# Patient Record
Sex: MALE | Race: WHITE | RURAL
[De-identification: names, ages, dates, MRNs, and addresses within clinical notes are randomized per-mention and may not be internally consistent; named-entity substitution may affect disease eponyms.]

---

## 2019-06-28 ENCOUNTER — ON CAMPUS - OUTPATIENT (OUTPATIENT)
Dept: RURAL HOSPITAL 6 | Facility: HOSPITAL | Age: 74
End: 2019-06-28
Payer: COMMERCIAL

## 2019-06-28 DIAGNOSIS — D12.4 BENIGN NEOPLASM OF DESCENDING COLON: ICD-10-CM

## 2019-06-28 DIAGNOSIS — Z80.9 FAMILY HISTORY OF MALIGNANT NEOPLASM, UNSPECIFIED: ICD-10-CM

## 2019-06-28 DIAGNOSIS — D12.3 BENIGN NEOPLASM OF TRANSVERSE COLON: ICD-10-CM

## 2019-06-28 DIAGNOSIS — K64.8 OTHER HEMORRHOIDS: ICD-10-CM

## 2019-06-28 DIAGNOSIS — D12.0 BENIGN NEOPLASM OF CECUM: ICD-10-CM

## 2019-06-28 DIAGNOSIS — D12.2 BENIGN NEOPLASM OF ASCENDING COLON: ICD-10-CM

## 2019-06-28 DIAGNOSIS — Z86.010 PERSONAL HISTORY OF COLONIC POLYPS: ICD-10-CM

## 2019-06-28 DIAGNOSIS — K57.90 DIVERTICULOSIS OF INTESTINE, PART UNSPECIFIED, WITHOUT PERFO: ICD-10-CM

## 2019-06-28 DIAGNOSIS — R19.5 OTHER FECAL ABNORMALITIES: ICD-10-CM

## 2019-06-28 PROCEDURE — 45385 COLONOSCOPY W/LESION REMOVAL: CPT | Mod: 33 | Performed by: INTERNAL MEDICINE

## 2021-11-10 ENCOUNTER — HOSPITAL ENCOUNTER (EMERGENCY)
Dept: HOSPITAL 22 - UTC | Age: 76
Discharge: HOME | End: 2021-11-10
Payer: MEDICARE

## 2021-11-10 VITALS
RESPIRATION RATE: 16 BRPM | SYSTOLIC BLOOD PRESSURE: 140 MMHG | OXYGEN SATURATION: 97 % | HEART RATE: 87 BPM | DIASTOLIC BLOOD PRESSURE: 70 MMHG | TEMPERATURE: 101.3 F

## 2021-11-10 VITALS
SYSTOLIC BLOOD PRESSURE: 118 MMHG | HEART RATE: 58 BPM | DIASTOLIC BLOOD PRESSURE: 63 MMHG | OXYGEN SATURATION: 96 % | RESPIRATION RATE: 16 BRPM

## 2021-11-10 VITALS
HEART RATE: 87 BPM | RESPIRATION RATE: 16 BRPM | OXYGEN SATURATION: 97 % | SYSTOLIC BLOOD PRESSURE: 140 MMHG | DIASTOLIC BLOOD PRESSURE: 70 MMHG

## 2021-11-10 VITALS
OXYGEN SATURATION: 96 % | RESPIRATION RATE: 16 BRPM | HEART RATE: 76 BPM | DIASTOLIC BLOOD PRESSURE: 81 MMHG | SYSTOLIC BLOOD PRESSURE: 126 MMHG

## 2021-11-10 VITALS
HEART RATE: 72 BPM | TEMPERATURE: 98 F | RESPIRATION RATE: 16 BRPM | DIASTOLIC BLOOD PRESSURE: 71 MMHG | OXYGEN SATURATION: 96 % | SYSTOLIC BLOOD PRESSURE: 120 MMHG

## 2021-11-10 VITALS — BODY MASS INDEX: 24.4 KG/M2 | BODY MASS INDEX: 24.3 KG/M2

## 2021-11-10 DIAGNOSIS — B34.9: ICD-10-CM

## 2021-11-10 DIAGNOSIS — E78.5: ICD-10-CM

## 2021-11-10 DIAGNOSIS — Z79.899: ICD-10-CM

## 2021-11-10 DIAGNOSIS — J12.9: Primary | ICD-10-CM

## 2021-11-10 DIAGNOSIS — I10: ICD-10-CM

## 2021-11-10 DIAGNOSIS — E11.9: ICD-10-CM

## 2021-11-10 LAB
ALBUMIN LEVEL: 3.6 G/DL (ref 3.5–5)
ALBUMIN/GLOB SERPL: 1.1 {RATIO} (ref 1.1–1.8)
ALP ISO SERPL-ACNC: 62 U/L (ref 38–126)
ALT SERPLBLD-CCNC: 12 U/L (ref 12–78)
ANION GAP SERPL CALC-SCNC: 12.3 MEQ/L (ref 5–15)
AST SERPL QL: 42 U/L (ref 17–59)
BILIRUBIN,TOTAL: 0.8 MG/DL (ref 0.2–1.3)
BUN SERPL-MCNC: 31 MG/DL (ref 9–20)
CALCIUM SPEC-MCNC: 8.6 MG/DL (ref 8.4–10.2)
CELLS COUNTED: 100
CHLORIDE SPEC-SCNC: 106 MMOL/L (ref 98–107)
CO2 SERPL-SCNC: 28 MMOL/L (ref 22–30)
CREAT BLD-SCNC: 1.8 MG/DL (ref 0.66–1.25)
CREATININE CLEARANCE ESTIMATED: 40 ML/MIN (ref 50–200)
ESTIMATED GLOMERULAR FILT RATE: 37 ML/MIN (ref 60–?)
GFR (AFRICAN AMERICAN): 44 ML/MIN (ref 60–?)
GLOBULIN SER CALC-MCNC: 3.3 G/DL (ref 1.3–3.2)
GLUCOSE: 61 MG/DL (ref 74–100)
HCT VFR BLD CALC: 39.4 % (ref 42–52)
HGB BLD-MCNC: 13 G/DL (ref 14.1–18)
MANUAL DIFFERENTIAL: (no result)
MCHC RBC-ENTMCNC: 33.1 G/DL (ref 31.8–35.4)
MCV RBC: 99.8 FL (ref 80–94)
MEAN CORPUSCULAR HEMOGLOBIN: 33 PG (ref 27–31.2)
PLATELET # BLD: 150 K/MM3 (ref 142–424)
POTASSIUM: 4.3 MMOL/L (ref 3.5–5.1)
PROT SERPL-MCNC: 6.9 G/DL (ref 6.3–8.2)
RBC # BLD AUTO: 3.94 M/MM3 (ref 4.6–6.2)
SODIUM SPEC-SCNC: 142 MMOL/L (ref 136–145)
WBC # BLD AUTO: 5.3 K/MM3 (ref 4.8–10.8)

## 2021-11-10 PROCEDURE — 36415 COLL VENOUS BLD VENIPUNCTURE: CPT

## 2021-11-10 PROCEDURE — 99282 EMERGENCY DEPT VISIT SF MDM: CPT

## 2021-11-10 PROCEDURE — U0003 INFECTIOUS AGENT DETECTION BY NUCLEIC ACID (DNA OR RNA); SEVERE ACUTE RESPIRATORY SYNDROME CORONAVIRUS 2 (SARS-COV-2) (CORONAVIRUS DISEASE [COVID-19]), AMPLIFIED PROBE TECHNIQUE, MAKING USE OF HIGH THROUGHPUT TECHNOLOGIES AS DESCRIBED BY CMS-2020-01-R: HCPCS

## 2021-11-10 PROCEDURE — 80053 COMPREHEN METABOLIC PANEL: CPT

## 2021-11-10 PROCEDURE — 71046 X-RAY EXAM CHEST 2 VIEWS: CPT

## 2021-11-10 PROCEDURE — 85007 BL SMEAR W/DIFF WBC COUNT: CPT

## 2021-11-10 PROCEDURE — C9803 HOPD COVID-19 SPEC COLLECT: HCPCS

## 2021-11-10 PROCEDURE — U0005 INFEC AGEN DETEC AMPLI PROBE: HCPCS

## 2021-11-10 PROCEDURE — 85025 COMPLETE CBC W/AUTO DIFF WBC: CPT

## 2023-02-06 ENCOUNTER — HOSPITAL ENCOUNTER (OUTPATIENT)
Dept: HOSPITAL 22 - ER | Age: 78
Setting detail: OBSERVATION
LOS: 2 days | Discharge: HOME | End: 2023-02-08
Payer: MEDICARE

## 2023-02-06 VITALS
RESPIRATION RATE: 18 BRPM | OXYGEN SATURATION: 97 % | HEART RATE: 60 BPM | DIASTOLIC BLOOD PRESSURE: 74 MMHG | SYSTOLIC BLOOD PRESSURE: 143 MMHG | TEMPERATURE: 98.24 F

## 2023-02-06 VITALS — BODY MASS INDEX: 24 KG/M2 | BODY MASS INDEX: 29.8 KG/M2 | BODY MASS INDEX: 24.7 KG/M2

## 2023-02-06 VITALS — SYSTOLIC BLOOD PRESSURE: 143 MMHG | DIASTOLIC BLOOD PRESSURE: 71 MMHG | HEART RATE: 60 BPM | OXYGEN SATURATION: 98 %

## 2023-02-06 DIAGNOSIS — D64.9: ICD-10-CM

## 2023-02-06 DIAGNOSIS — N17.9: ICD-10-CM

## 2023-02-06 DIAGNOSIS — U07.1: Primary | ICD-10-CM

## 2023-02-06 DIAGNOSIS — E11.22: ICD-10-CM

## 2023-02-06 DIAGNOSIS — G20: ICD-10-CM

## 2023-02-06 DIAGNOSIS — N18.31: ICD-10-CM

## 2023-02-06 DIAGNOSIS — E11.649: ICD-10-CM

## 2023-02-06 DIAGNOSIS — Z79.899: ICD-10-CM

## 2023-02-06 DIAGNOSIS — Z79.84: ICD-10-CM

## 2023-02-06 DIAGNOSIS — I12.9: ICD-10-CM

## 2023-02-06 LAB
ALBUMIN LEVEL: 3.9 G/DL (ref 3.5–5)
ALBUMIN/GLOB SERPL: 1.3 {RATIO} (ref 1.1–1.8)
ALP ISO SERPL-ACNC: 64 U/L (ref 38–126)
ALT SERPLBLD-CCNC: 9 U/L (ref 12–78)
ANION GAP SERPL CALC-SCNC: 7.7 MEQ/L (ref 5–15)
AST SERPL QL: 23 U/L (ref 17–59)
BILIRUBIN,TOTAL: 0.6 MG/DL (ref 0.2–1.3)
BUN SERPL-MCNC: 30 MG/DL (ref 9–20)
CALCIUM SPEC-MCNC: 8.1 MG/DL (ref 8.4–10.2)
CHLORIDE SPEC-SCNC: 108 MMOL/L (ref 98–107)
CO2 SERPL-SCNC: 29 MMOL/L (ref 22–30)
COLOR UR: YELLOW
CREAT BLD-SCNC: 1.4 MG/DL (ref 0.66–1.25)
CREATININE CLEARANCE ESTIMATED: 61 ML/MIN (ref 50–200)
ESTIMATED GLOMERULAR FILT RATE: 49 ML/MIN (ref 60–?)
GFR (AFRICAN AMERICAN): 59 ML/MIN (ref 60–?)
GLOBULIN SER CALC-MCNC: 3.1 G/DL (ref 1.3–3.2)
GLUCOSE BLDC GLUCOMTR-MCNC: 70 MG/DL (ref 70–110)
GLUCOSE BLDC GLUCOMTR-MCNC: 78 MG/DL (ref 70–110)
GLUCOSE: 63 MG/DL (ref 74–100)
HCT VFR BLD CALC: 43.7 % (ref 42–52)
HGB BLD-MCNC: 14.2 G/DL (ref 14.1–18)
MCHC RBC-ENTMCNC: 32.5 G/DL (ref 31.8–35.4)
MCV RBC: 100.8 FL (ref 80–94)
MEAN CORPUSCULAR HEMOGLOBIN: 32.8 PG (ref 27–31.2)
MICRO URNS: (no result)
PH UR: 6 [PH] (ref 5–8.5)
PLATELET # BLD: 189 K/MM3 (ref 142–424)
POTASSIUM: 3.7 MMOL/L (ref 3.5–5.1)
PROT SERPL-MCNC: 7 G/DL (ref 6.3–8.2)
PROT UR STRIP-MCNC: (no result) MG/DL
RBC # BLD AUTO: 4.34 M/MM3 (ref 4.6–6.2)
SODIUM SPEC-SCNC: 141 MMOL/L (ref 136–145)
SP GR UR: >= 1.03 (ref 1–1.03)
UROBILINOGEN UR QL: 2 EU/DL
WBC # BLD AUTO: 7.8 K/MM3 (ref 4.8–10.8)

## 2023-02-06 PROCEDURE — 87070 CULTURE OTHR SPECIMN AEROBIC: CPT

## 2023-02-06 PROCEDURE — C9803 HOPD COVID-19 SPEC COLLECT: HCPCS

## 2023-02-06 PROCEDURE — 83036 HEMOGLOBIN GLYCOSYLATED A1C: CPT

## 2023-02-06 PROCEDURE — 80048 BASIC METABOLIC PNL TOTAL CA: CPT

## 2023-02-06 PROCEDURE — 71045 X-RAY EXAM CHEST 1 VIEW: CPT

## 2023-02-06 PROCEDURE — G0378 HOSPITAL OBSERVATION PER HR: HCPCS

## 2023-02-06 PROCEDURE — U0005 INFEC AGEN DETEC AMPLI PROBE: HCPCS

## 2023-02-06 PROCEDURE — 81001 URINALYSIS AUTO W/SCOPE: CPT

## 2023-02-06 PROCEDURE — 87205 SMEAR GRAM STAIN: CPT

## 2023-02-06 PROCEDURE — 36415 COLL VENOUS BLD VENIPUNCTURE: CPT

## 2023-02-06 PROCEDURE — 82962 GLUCOSE BLOOD TEST: CPT

## 2023-02-06 PROCEDURE — 85025 COMPLETE CBC W/AUTO DIFF WBC: CPT

## 2023-02-06 PROCEDURE — 99285 EMERGENCY DEPT VISIT HI MDM: CPT

## 2023-02-06 PROCEDURE — U0003 INFECTIOUS AGENT DETECTION BY NUCLEIC ACID (DNA OR RNA); SEVERE ACUTE RESPIRATORY SYNDROME CORONAVIRUS 2 (SARS-COV-2) (CORONAVIRUS DISEASE [COVID-19]), AMPLIFIED PROBE TECHNIQUE, MAKING USE OF HIGH THROUGHPUT TECHNOLOGIES AS DESCRIBED BY CMS-2020-01-R: HCPCS

## 2023-02-06 PROCEDURE — 80053 COMPREHEN METABOLIC PANEL: CPT

## 2023-02-06 NOTE — PC.NURSE
Patients blood glucose upon recheck was 70. Patient was given 4 peanut butter crackers and an orange juice upon arrival to ER.

## 2023-02-07 VITALS — SYSTOLIC BLOOD PRESSURE: 133 MMHG | OXYGEN SATURATION: 97 % | DIASTOLIC BLOOD PRESSURE: 81 MMHG | HEART RATE: 60 BPM

## 2023-02-07 VITALS
TEMPERATURE: 97.88 F | DIASTOLIC BLOOD PRESSURE: 77 MMHG | SYSTOLIC BLOOD PRESSURE: 148 MMHG | OXYGEN SATURATION: 98 % | HEART RATE: 66 BPM | RESPIRATION RATE: 20 BRPM

## 2023-02-07 VITALS
RESPIRATION RATE: 16 BRPM | HEART RATE: 57 BPM | TEMPERATURE: 98 F | OXYGEN SATURATION: 97 % | SYSTOLIC BLOOD PRESSURE: 133 MMHG | DIASTOLIC BLOOD PRESSURE: 81 MMHG

## 2023-02-07 VITALS
RESPIRATION RATE: 18 BRPM | DIASTOLIC BLOOD PRESSURE: 74 MMHG | TEMPERATURE: 98.7 F | HEART RATE: 66 BPM | OXYGEN SATURATION: 97 % | SYSTOLIC BLOOD PRESSURE: 157 MMHG

## 2023-02-07 VITALS
RESPIRATION RATE: 19 BRPM | HEART RATE: 69 BPM | DIASTOLIC BLOOD PRESSURE: 73 MMHG | TEMPERATURE: 98.4 F | OXYGEN SATURATION: 97 % | SYSTOLIC BLOOD PRESSURE: 145 MMHG

## 2023-02-07 VITALS — DIASTOLIC BLOOD PRESSURE: 75 MMHG | HEART RATE: 85 BPM | OXYGEN SATURATION: 97 % | SYSTOLIC BLOOD PRESSURE: 144 MMHG

## 2023-02-07 VITALS
HEART RATE: 64 BPM | DIASTOLIC BLOOD PRESSURE: 72 MMHG | RESPIRATION RATE: 21 BRPM | TEMPERATURE: 97.8 F | SYSTOLIC BLOOD PRESSURE: 120 MMHG | OXYGEN SATURATION: 93 %

## 2023-02-07 VITALS
TEMPERATURE: 98.24 F | HEART RATE: 75 BPM | RESPIRATION RATE: 20 BRPM | DIASTOLIC BLOOD PRESSURE: 67 MMHG | OXYGEN SATURATION: 99 % | SYSTOLIC BLOOD PRESSURE: 170 MMHG

## 2023-02-07 VITALS
OXYGEN SATURATION: 97 % | TEMPERATURE: 98.2 F | SYSTOLIC BLOOD PRESSURE: 159 MMHG | DIASTOLIC BLOOD PRESSURE: 78 MMHG | HEART RATE: 61 BPM | RESPIRATION RATE: 18 BRPM

## 2023-02-07 LAB
ANION GAP SERPL CALC-SCNC: 9.2 MEQ/L (ref 5–15)
BUN SERPL-MCNC: 28 MG/DL (ref 9–20)
CALCIUM SPEC-MCNC: 8 MG/DL (ref 8.4–10.2)
CHLORIDE SPEC-SCNC: 109 MMOL/L (ref 98–107)
CO2 SERPL-SCNC: 28 MMOL/L (ref 22–30)
CREAT BLD-SCNC: 1.3 MG/DL (ref 0.66–1.25)
CREATININE CLEARANCE ESTIMATED: 53 ML/MIN (ref 50–200)
ESTIMATED GLOMERULAR FILT RATE: 53 ML/MIN (ref 60–?)
GFR (AFRICAN AMERICAN): 65 ML/MIN (ref 60–?)
GLUCOSE BLDC GLUCOMTR-MCNC: 51 MG/DL (ref 70–110)
GLUCOSE BLDC GLUCOMTR-MCNC: 56 MG/DL (ref 70–110)
GLUCOSE BLDC GLUCOMTR-MCNC: 58 MG/DL (ref 70–110)
GLUCOSE BLDC GLUCOMTR-MCNC: 60 MG/DL (ref 70–110)
GLUCOSE BLDC GLUCOMTR-MCNC: 68 MG/DL (ref 70–110)
GLUCOSE BLDC GLUCOMTR-MCNC: 69 MG/DL (ref 70–110)
GLUCOSE BLDC GLUCOMTR-MCNC: 69 MG/DL (ref 70–110)
GLUCOSE BLDC GLUCOMTR-MCNC: 71 MG/DL (ref 70–110)
GLUCOSE BLDC GLUCOMTR-MCNC: 79 MG/DL (ref 70–110)
GLUCOSE BLDC GLUCOMTR-MCNC: 80 MG/DL (ref 70–110)
GLUCOSE BLDC GLUCOMTR-MCNC: 80 MG/DL (ref 70–110)
GLUCOSE BLDC GLUCOMTR-MCNC: 99 MG/DL (ref 70–110)
GLUCOSE: 53 MG/DL (ref 74–100)
HBA1C MFR BLD: 5.9 % (ref 4–6)
HCT VFR BLD CALC: 39.4 % (ref 42–52)
HGB BLD-MCNC: 12.8 G/DL (ref 14.1–18)
MCHC RBC-ENTMCNC: 32.4 G/DL (ref 31.8–35.4)
MCV RBC: 101.7 FL (ref 80–94)
MEAN CORPUSCULAR HEMOGLOBIN: 33 PG (ref 27–31.2)
PLATELET # BLD: 164 K/MM3 (ref 142–424)
POTASSIUM: 4.2 MMOL/L (ref 3.5–5.1)
RBC # BLD AUTO: 3.87 M/MM3 (ref 4.6–6.2)
SODIUM SPEC-SCNC: 142 MMOL/L (ref 136–145)
WBC # BLD AUTO: 6.3 K/MM3 (ref 4.8–10.8)

## 2023-02-07 NOTE — PC.NURSE
Patient was given a pepsi per his request to increase blood glucose. Patient finished 8oz of regular pepsi at approx 0015.

## 2023-02-07 NOTE — PC.NURSE
Patients blood glucose rechecked. fsbg 80. Patient remains alert oriented and stable. Wife at bedside.

## 2023-02-08 VITALS
HEART RATE: 65 BPM | OXYGEN SATURATION: 93 % | SYSTOLIC BLOOD PRESSURE: 151 MMHG | DIASTOLIC BLOOD PRESSURE: 78 MMHG | TEMPERATURE: 98.78 F | RESPIRATION RATE: 22 BRPM

## 2023-02-08 VITALS
SYSTOLIC BLOOD PRESSURE: 145 MMHG | OXYGEN SATURATION: 98 % | HEART RATE: 71 BPM | DIASTOLIC BLOOD PRESSURE: 79 MMHG | RESPIRATION RATE: 18 BRPM | TEMPERATURE: 98.5 F

## 2023-02-08 VITALS
DIASTOLIC BLOOD PRESSURE: 79 MMHG | HEART RATE: 74 BPM | TEMPERATURE: 98.96 F | OXYGEN SATURATION: 95 % | SYSTOLIC BLOOD PRESSURE: 139 MMHG | RESPIRATION RATE: 18 BRPM

## 2023-02-08 LAB
ANION GAP SERPL CALC-SCNC: 9.2 MEQ/L (ref 5–15)
BUN SERPL-MCNC: 19 MG/DL (ref 9–20)
CALCIUM SPEC-MCNC: 8.1 MG/DL (ref 8.4–10.2)
CHLORIDE SPEC-SCNC: 107 MMOL/L (ref 98–107)
CO2 SERPL-SCNC: 28 MMOL/L (ref 22–30)
CREAT BLD-SCNC: 1.2 MG/DL (ref 0.66–1.25)
CREATININE CLEARANCE ESTIMATED: 59 ML/MIN (ref 50–200)
ESTIMATED GLOMERULAR FILT RATE: 59 ML/MIN (ref 60–?)
GFR (AFRICAN AMERICAN): 71 ML/MIN (ref 60–?)
GLUCOSE BLDC GLUCOMTR-MCNC: 102 MG/DL (ref 70–110)
GLUCOSE BLDC GLUCOMTR-MCNC: 110 MG/DL (ref 70–110)
GLUCOSE BLDC GLUCOMTR-MCNC: 96 MG/DL (ref 70–110)
GLUCOSE: 115 MG/DL (ref 74–100)
HCT VFR BLD CALC: 40.8 % (ref 42–52)
HGB BLD-MCNC: 13.2 G/DL (ref 14.1–18)
MCHC RBC-ENTMCNC: 32.4 G/DL (ref 31.8–35.4)
MCV RBC: 101.5 FL (ref 80–94)
MEAN CORPUSCULAR HEMOGLOBIN: 32.9 PG (ref 27–31.2)
PLATELET # BLD: 187 K/MM3 (ref 142–424)
POTASSIUM: 4.2 MMOL/L (ref 3.5–5.1)
RBC # BLD AUTO: 4.02 M/MM3 (ref 4.6–6.2)
SODIUM SPEC-SCNC: 140 MMOL/L (ref 136–145)
WBC # BLD AUTO: 6.2 K/MM3 (ref 4.8–10.8)

## 2024-03-21 ENCOUNTER — HOSPITAL ENCOUNTER (OUTPATIENT)
Dept: HOSPITAL 22 - LAB.DROPOF | Age: 79
End: 2024-03-21
Payer: MEDICARE

## 2024-03-21 DIAGNOSIS — B96.4: ICD-10-CM

## 2024-03-21 DIAGNOSIS — R39.9: Primary | ICD-10-CM

## 2024-03-21 DIAGNOSIS — N39.0: ICD-10-CM

## 2024-03-21 LAB
COLOR UR: YELLOW
LEUKOCYTE ESTERASE UR QL STRIP: (no result)
MICRO URNS: (no result)
PH UR: 7 [PH] (ref 5–8.5)
PROT UR STRIP-MCNC: (no result) MG/DL
SP GR UR: 1.02 (ref 1–1.03)
UROBILINOGEN UR QL: 0.2 EU/DL
WBC # UR: (no result) #/HPF (ref 0–3)

## 2024-03-21 PROCEDURE — 81001 URINALYSIS AUTO W/SCOPE: CPT

## 2024-03-21 PROCEDURE — 87086 URINE CULTURE/COLONY COUNT: CPT

## 2025-06-03 ENCOUNTER — APPOINTMENT (OUTPATIENT)
Dept: CT IMAGING | Facility: HOSPITAL | Age: 80
End: 2025-06-03
Payer: MEDICARE

## 2025-06-03 ENCOUNTER — APPOINTMENT (OUTPATIENT)
Dept: GENERAL RADIOLOGY | Facility: HOSPITAL | Age: 80
End: 2025-06-03
Payer: MEDICARE

## 2025-06-03 ENCOUNTER — HOSPITAL ENCOUNTER (INPATIENT)
Facility: HOSPITAL | Age: 80
LOS: 1 days | Discharge: HOME | End: 2025-06-05
Attending: EMERGENCY MEDICINE | Admitting: STUDENT IN AN ORGANIZED HEALTH CARE EDUCATION/TRAINING PROGRAM
Payer: MEDICARE

## 2025-06-03 DIAGNOSIS — R00.2 PALPITATIONS: ICD-10-CM

## 2025-06-03 DIAGNOSIS — R47.1 DYSARTHRIA: ICD-10-CM

## 2025-06-03 DIAGNOSIS — R47.01 APHASIA: Primary | ICD-10-CM

## 2025-06-03 DIAGNOSIS — I10 UNCONTROLLED HYPERTENSION: ICD-10-CM

## 2025-06-03 PROBLEM — G20.A1 PARKINSON DISEASE: Status: ACTIVE | Noted: 2025-06-03

## 2025-06-03 PROBLEM — E78.5 HYPERLIPIDEMIA: Status: ACTIVE | Noted: 2025-06-03

## 2025-06-03 LAB
ALT SERPL W P-5'-P-CCNC: <5 U/L (ref 1–41)
ANION GAP SERPL CALCULATED.3IONS-SCNC: 16 MMOL/L (ref 5–15)
APTT PPP: 36 SECONDS (ref 22–39)
AST SERPL-CCNC: 14 U/L (ref 1–40)
ATMOSPHERIC PRESS: ABNORMAL MM[HG]
BACTERIA UR QL AUTO: ABNORMAL /HPF
BASE EXCESS BLDV CALC-SCNC: 3 MMOL/L (ref -2–2)
BASOPHILS # BLD AUTO: 0.04 10*3/MM3 (ref 0–0.2)
BASOPHILS NFR BLD AUTO: 0.6 % (ref 0–1.5)
BDY SITE: ABNORMAL
BILIRUB UR QL STRIP: NEGATIVE
BODY TEMPERATURE: 37
BUN BLDA-MCNC: 31 MG/DL (ref 8–26)
BUN SERPL-MCNC: 30.7 MG/DL (ref 8–23)
BUN/CREAT SERPL: 16.5 (ref 7–25)
CA-I BLDA-SCNC: 1.21 MMOL/L (ref 1.2–1.32)
CALCIUM SPEC-SCNC: 9.6 MG/DL (ref 8.6–10.5)
CHLORIDE BLDA-SCNC: 102 MMOL/L (ref 98–109)
CHLORIDE SERPL-SCNC: 102 MMOL/L (ref 98–107)
CK SERPL-CCNC: 84 U/L (ref 20–200)
CLARITY UR: ABNORMAL
CO2 BLDA-SCNC: 25 MMOL/L (ref 24–29)
CO2 BLDA-SCNC: 28.4 MMOL/L (ref 22–33)
CO2 SERPL-SCNC: 24 MMOL/L (ref 22–29)
COHGB MFR BLD: 1.2 %
COLOR UR: YELLOW
CREAT BLDA-MCNC: 2 MG/DL (ref 0.6–1.3)
CREAT SERPL-MCNC: 1.86 MG/DL (ref 0.76–1.27)
DEPRECATED RDW RBC AUTO: 49.1 FL (ref 37–54)
EGFRCR SERPLBLD CKD-EPI 2021: 32.9 ML/MIN/1.73
EGFRCR SERPLBLD CKD-EPI 2021: 35.9 ML/MIN/1.73
EOSINOPHIL # BLD AUTO: 0.09 10*3/MM3 (ref 0–0.4)
EOSINOPHIL NFR BLD AUTO: 1.4 % (ref 0.3–6.2)
EPAP: 0
ERYTHROCYTE [DISTWIDTH] IN BLOOD BY AUTOMATED COUNT: 13.2 % (ref 12.3–15.4)
GLUCOSE BLDC GLUCOMTR-MCNC: 127 MG/DL (ref 70–130)
GLUCOSE SERPL-MCNC: 126 MG/DL (ref 65–99)
GLUCOSE UR STRIP-MCNC: NEGATIVE MG/DL
HCO3 BLDV-SCNC: 27.2 MMOL/L (ref 22–28)
HCT VFR BLD AUTO: 41.7 % (ref 37.5–51)
HCT VFR BLDA CALC: 41 % (ref 38–51)
HGB BLD-MCNC: 13.3 G/DL (ref 13–17.7)
HGB BLDA-MCNC: 13.1 G/DL (ref 13.5–17.5)
HGB BLDA-MCNC: 13.9 G/DL (ref 12–17)
HGB UR QL STRIP.AUTO: ABNORMAL
HOLD SPECIMEN: NORMAL
HYALINE CASTS UR QL AUTO: ABNORMAL /LPF
IMM GRANULOCYTES # BLD AUTO: 0.02 10*3/MM3 (ref 0–0.05)
IMM GRANULOCYTES NFR BLD AUTO: 0.3 % (ref 0–0.5)
INHALED O2 CONCENTRATION: 21 %
INR PPP: 1.08 (ref 0.89–1.12)
IPAP: 0
KETONES UR QL STRIP: NEGATIVE
LEUKOCYTE ESTERASE UR QL STRIP.AUTO: ABNORMAL
LYMPHOCYTES # BLD AUTO: 0.95 10*3/MM3 (ref 0.7–3.1)
LYMPHOCYTES NFR BLD AUTO: 14.7 % (ref 19.6–45.3)
MCH RBC QN AUTO: 32 PG (ref 26.6–33)
MCHC RBC AUTO-ENTMCNC: 31.9 G/DL (ref 31.5–35.7)
MCV RBC AUTO: 100.5 FL (ref 79–97)
METHGB BLD QL: 0.5 %
MODALITY: ABNORMAL
MONOCYTES # BLD AUTO: 0.81 10*3/MM3 (ref 0.1–0.9)
MONOCYTES NFR BLD AUTO: 12.6 % (ref 5–12)
NEUTROPHILS NFR BLD AUTO: 4.54 10*3/MM3 (ref 1.7–7)
NEUTROPHILS NFR BLD AUTO: 70.4 % (ref 42.7–76)
NITRITE UR QL STRIP: NEGATIVE
NRBC BLD AUTO-RTO: 0 /100 WBC (ref 0–0.2)
OXYHGB MFR BLDV: 93.4 %
PAW @ PEAK INSP FLOW SETTING VENT: 0 CMH2O
PCO2 BLDV: 39.4 MM HG (ref 41–51)
PH BLDV: 7.45 PH UNITS (ref 7.31–7.41)
PH UR STRIP.AUTO: 7 [PH] (ref 5–8)
PLATELET # BLD AUTO: 152 10*3/MM3 (ref 140–450)
PMV BLD AUTO: 9.3 FL (ref 6–12)
PO2 BLDV: 68.6 MM HG (ref 27–53)
POTASSIUM BLDA-SCNC: 4.4 MMOL/L (ref 3.5–4.9)
POTASSIUM SERPL-SCNC: 4.5 MMOL/L (ref 3.5–5.2)
PROT UR QL STRIP: ABNORMAL
PROTHROMBIN TIME: 14.6 SECONDS (ref 12.2–15.3)
QT INTERVAL: 400 MS
QTC INTERVAL: 467 MS
RBC # BLD AUTO: 4.15 10*6/MM3 (ref 4.14–5.8)
RBC # UR STRIP: ABNORMAL /HPF
REF LAB TEST METHOD: ABNORMAL
SODIUM BLD-SCNC: 140 MMOL/L (ref 138–146)
SODIUM SERPL-SCNC: 142 MMOL/L (ref 136–145)
SP GR UR STRIP: 1.01 (ref 1–1.03)
SQUAMOUS #/AREA URNS HPF: ABNORMAL /HPF
T4 FREE SERPL-MCNC: 0.66 NG/DL (ref 0.92–1.68)
TOTAL RATE: 0 BREATHS/MINUTE
TSH SERPL DL<=0.05 MIU/L-ACNC: 31.6 UIU/ML (ref 0.27–4.2)
UROBILINOGEN UR QL STRIP: ABNORMAL
WBC # UR STRIP: ABNORMAL /HPF
WBC NRBC COR # BLD AUTO: 6.45 10*3/MM3 (ref 3.4–10.8)
WHOLE BLOOD HOLD COAG: NORMAL
WHOLE BLOOD HOLD SPECIMEN: NORMAL

## 2025-06-03 PROCEDURE — 87086 URINE CULTURE/COLONY COUNT: CPT | Performed by: STUDENT IN AN ORGANIZED HEALTH CARE EDUCATION/TRAINING PROGRAM

## 2025-06-03 PROCEDURE — 85610 PROTHROMBIN TIME: CPT | Performed by: EMERGENCY MEDICINE

## 2025-06-03 PROCEDURE — 84540 ASSAY OF URINE/UREA-N: CPT | Performed by: STUDENT IN AN ORGANIZED HEALTH CARE EDUCATION/TRAINING PROGRAM

## 2025-06-03 PROCEDURE — 80047 BASIC METABLC PNL IONIZED CA: CPT | Performed by: EMERGENCY MEDICINE

## 2025-06-03 PROCEDURE — G0378 HOSPITAL OBSERVATION PER HR: HCPCS

## 2025-06-03 PROCEDURE — 84460 ALANINE AMINO (ALT) (SGPT): CPT | Performed by: EMERGENCY MEDICINE

## 2025-06-03 PROCEDURE — 85014 HEMATOCRIT: CPT | Performed by: EMERGENCY MEDICINE

## 2025-06-03 PROCEDURE — 99223 1ST HOSP IP/OBS HIGH 75: CPT | Performed by: STUDENT IN AN ORGANIZED HEALTH CARE EDUCATION/TRAINING PROGRAM

## 2025-06-03 PROCEDURE — 84443 ASSAY THYROID STIM HORMONE: CPT | Performed by: STUDENT IN AN ORGANIZED HEALTH CARE EDUCATION/TRAINING PROGRAM

## 2025-06-03 PROCEDURE — 80048 BASIC METABOLIC PNL TOTAL CA: CPT | Performed by: STUDENT IN AN ORGANIZED HEALTH CARE EDUCATION/TRAINING PROGRAM

## 2025-06-03 PROCEDURE — 70498 CT ANGIOGRAPHY NECK: CPT

## 2025-06-03 PROCEDURE — 70450 CT HEAD/BRAIN W/O DYE: CPT

## 2025-06-03 PROCEDURE — 70496 CT ANGIOGRAPHY HEAD: CPT

## 2025-06-03 PROCEDURE — 82550 ASSAY OF CK (CPK): CPT | Performed by: STUDENT IN AN ORGANIZED HEALTH CARE EDUCATION/TRAINING PROGRAM

## 2025-06-03 PROCEDURE — 71045 X-RAY EXAM CHEST 1 VIEW: CPT

## 2025-06-03 PROCEDURE — 82570 ASSAY OF URINE CREATININE: CPT | Performed by: STUDENT IN AN ORGANIZED HEALTH CARE EDUCATION/TRAINING PROGRAM

## 2025-06-03 PROCEDURE — 0042T HC CT CEREBRAL PERFUSION W/WO CONTRAST: CPT

## 2025-06-03 PROCEDURE — 84439 ASSAY OF FREE THYROXINE: CPT | Performed by: STUDENT IN AN ORGANIZED HEALTH CARE EDUCATION/TRAINING PROGRAM

## 2025-06-03 PROCEDURE — 84450 TRANSFERASE (AST) (SGOT): CPT | Performed by: EMERGENCY MEDICINE

## 2025-06-03 PROCEDURE — 93005 ELECTROCARDIOGRAM TRACING: CPT | Performed by: EMERGENCY MEDICINE

## 2025-06-03 PROCEDURE — 82805 BLOOD GASES W/O2 SATURATION: CPT

## 2025-06-03 PROCEDURE — 85025 COMPLETE CBC W/AUTO DIFF WBC: CPT | Performed by: EMERGENCY MEDICINE

## 2025-06-03 PROCEDURE — 25510000001 IOPAMIDOL PER 1 ML: Performed by: EMERGENCY MEDICINE

## 2025-06-03 PROCEDURE — 87147 CULTURE TYPE IMMUNOLOGIC: CPT | Performed by: STUDENT IN AN ORGANIZED HEALTH CARE EDUCATION/TRAINING PROGRAM

## 2025-06-03 PROCEDURE — 81001 URINALYSIS AUTO W/SCOPE: CPT

## 2025-06-03 PROCEDURE — 99291 CRITICAL CARE FIRST HOUR: CPT

## 2025-06-03 PROCEDURE — 25010000002 CEFTRIAXONE PER 250 MG: Performed by: STUDENT IN AN ORGANIZED HEALTH CARE EDUCATION/TRAINING PROGRAM

## 2025-06-03 PROCEDURE — 85730 THROMBOPLASTIN TIME PARTIAL: CPT | Performed by: EMERGENCY MEDICINE

## 2025-06-03 PROCEDURE — 84145 PROCALCITONIN (PCT): CPT | Performed by: STUDENT IN AN ORGANIZED HEALTH CARE EDUCATION/TRAINING PROGRAM

## 2025-06-03 RX ORDER — SODIUM CHLORIDE 0.9 % (FLUSH) 0.9 %
10 SYRINGE (ML) INJECTION AS NEEDED
Status: DISCONTINUED | OUTPATIENT
Start: 2025-06-03 | End: 2025-06-04

## 2025-06-03 RX ORDER — SODIUM CHLORIDE 9 MG/ML
40 INJECTION, SOLUTION INTRAVENOUS AS NEEDED
Status: DISCONTINUED | OUTPATIENT
Start: 2025-06-03 | End: 2025-06-05 | Stop reason: HOSPADM

## 2025-06-03 RX ORDER — ACETAMINOPHEN 325 MG/1
650 TABLET ORAL EVERY 4 HOURS PRN
Status: DISCONTINUED | OUTPATIENT
Start: 2025-06-03 | End: 2025-06-05 | Stop reason: HOSPADM

## 2025-06-03 RX ORDER — ASPIRIN 325 MG
325 TABLET ORAL DAILY
Status: DISCONTINUED | OUTPATIENT
Start: 2025-06-03 | End: 2025-06-05

## 2025-06-03 RX ORDER — CARBIDOPA AND LEVODOPA 25; 100 MG/1; MG/1
1 TABLET ORAL 3 TIMES DAILY
Status: DISCONTINUED | OUTPATIENT
Start: 2025-06-03 | End: 2025-06-05 | Stop reason: HOSPADM

## 2025-06-03 RX ORDER — BISACODYL 5 MG/1
5 TABLET, DELAYED RELEASE ORAL DAILY PRN
Status: DISCONTINUED | OUTPATIENT
Start: 2025-06-03 | End: 2025-06-04

## 2025-06-03 RX ORDER — SODIUM CHLORIDE 0.9 % (FLUSH) 0.9 %
10 SYRINGE (ML) INJECTION AS NEEDED
Status: DISCONTINUED | OUTPATIENT
Start: 2025-06-03 | End: 2025-06-05 | Stop reason: HOSPADM

## 2025-06-03 RX ORDER — SODIUM CHLORIDE, SODIUM LACTATE, POTASSIUM CHLORIDE, CALCIUM CHLORIDE 600; 310; 30; 20 MG/100ML; MG/100ML; MG/100ML; MG/100ML
125 INJECTION, SOLUTION INTRAVENOUS CONTINUOUS
Status: ACTIVE | OUTPATIENT
Start: 2025-06-03 | End: 2025-06-05

## 2025-06-03 RX ORDER — SODIUM CHLORIDE 9 MG/ML
40 INJECTION, SOLUTION INTRAVENOUS AS NEEDED
Status: DISCONTINUED | OUTPATIENT
Start: 2025-06-03 | End: 2025-06-04

## 2025-06-03 RX ORDER — SODIUM CHLORIDE 0.9 % (FLUSH) 0.9 %
10 SYRINGE (ML) INJECTION EVERY 12 HOURS SCHEDULED
Status: DISCONTINUED | OUTPATIENT
Start: 2025-06-03 | End: 2025-06-05 | Stop reason: HOSPADM

## 2025-06-03 RX ORDER — ROSUVASTATIN CALCIUM 20 MG/1
20 TABLET, COATED ORAL NIGHTLY
Status: DISCONTINUED | OUTPATIENT
Start: 2025-06-03 | End: 2025-06-05 | Stop reason: HOSPADM

## 2025-06-03 RX ORDER — BISACODYL 10 MG
10 SUPPOSITORY, RECTAL RECTAL DAILY PRN
Status: DISCONTINUED | OUTPATIENT
Start: 2025-06-03 | End: 2025-06-04

## 2025-06-03 RX ORDER — ACETAMINOPHEN 650 MG/1
650 SUPPOSITORY RECTAL EVERY 4 HOURS PRN
Status: DISCONTINUED | OUTPATIENT
Start: 2025-06-03 | End: 2025-06-05 | Stop reason: HOSPADM

## 2025-06-03 RX ORDER — SODIUM CHLORIDE 0.9 % (FLUSH) 0.9 %
10 SYRINGE (ML) INJECTION EVERY 12 HOURS SCHEDULED
Status: DISCONTINUED | OUTPATIENT
Start: 2025-06-04 | End: 2025-06-04

## 2025-06-03 RX ORDER — ASPIRIN 300 MG/1
300 SUPPOSITORY RECTAL DAILY
Status: DISCONTINUED | OUTPATIENT
Start: 2025-06-03 | End: 2025-06-04

## 2025-06-03 RX ORDER — ACETAMINOPHEN 160 MG/5ML
650 SOLUTION ORAL EVERY 4 HOURS PRN
Status: DISCONTINUED | OUTPATIENT
Start: 2025-06-03 | End: 2025-06-05 | Stop reason: HOSPADM

## 2025-06-03 RX ORDER — HEPARIN SODIUM 5000 [USP'U]/ML
5000 INJECTION, SOLUTION INTRAVENOUS; SUBCUTANEOUS EVERY 8 HOURS SCHEDULED
Status: DISCONTINUED | OUTPATIENT
Start: 2025-06-03 | End: 2025-06-05 | Stop reason: HOSPADM

## 2025-06-03 RX ORDER — POLYETHYLENE GLYCOL 3350 17 G/17G
17 POWDER, FOR SOLUTION ORAL DAILY PRN
Status: DISCONTINUED | OUTPATIENT
Start: 2025-06-03 | End: 2025-06-04

## 2025-06-03 RX ORDER — IOPAMIDOL 755 MG/ML
115 INJECTION, SOLUTION INTRAVASCULAR
Status: COMPLETED | OUTPATIENT
Start: 2025-06-03 | End: 2025-06-03

## 2025-06-03 RX ORDER — AMOXICILLIN 250 MG
2 CAPSULE ORAL 2 TIMES DAILY PRN
Status: DISCONTINUED | OUTPATIENT
Start: 2025-06-03 | End: 2025-06-04

## 2025-06-03 RX ADMIN — ASPIRIN 325 MG: 325 TABLET ORAL at 19:56

## 2025-06-03 RX ADMIN — SODIUM CHLORIDE 1000 MG: 900 INJECTION INTRAVENOUS at 21:58

## 2025-06-03 RX ADMIN — CARBIDOPA AND LEVODOPA 1 TABLET: 25; 100 TABLET ORAL at 22:52

## 2025-06-03 RX ADMIN — IOPAMIDOL 115 ML: 755 INJECTION, SOLUTION INTRAVENOUS at 18:41

## 2025-06-03 NOTE — CONSULTS
"Stroke Consult Note    Patient Name: Lane Dong   MRN: [id number]  Age: 80 y.o.  Sex: male  : 1945    Primary Care Physician: No primary care provider on file.  Referring Physician:  MD Venancio    TIME STROKE TEAM CALLED:  EST     TIME PATIENT SEEN:  EST    Handedness: Right  Race:     Chief Complaint/Reason for Consultation: Facial droop, speech difficulty    HPI: Lane Dong is a 80-year-old male with past medical history of HTN, Parkinson's disease, and hyperlipidemia presents [facility] emergency department via EMS from home for further evaluation of left facial droop and speech difficulty.  Per EMS patient was normal before he laid down for his nap at 1 PM.  When he woke up he was experiencing \"word salad\" and incoherent speech.  When EMS arrived to scene his blood pressure was noted to be 228/124.  They administered 5 mg of metoprolol.  On arrival to the emergency department patient, he was able to talk in full sentences and was much improved per EMS.  He was alert and oriented x 3 and was able to carry on a conversation.  Wife at the bedside tells me that he is now back to his baseline. CT head revealed no acute intracranial abnormalities chronic appearing changes of the aging brain seen.  CTA head/neck reveals mild atherosclerotic disease no LVO or flow-limiting stenosis.  Negative for any core infarct or ischemic penumbra at risk.  He will be admitted to the hospital service for further evaluation.      Last Known Normal Date/Time: 2025 1300 EST     Review of Systems   Neurological:  Positive for facial asymmetry and speech difficulty.      No past medical history on file.  No past surgical history on file.  No family history on file.     No Known Allergies  Prior to Admission medications    Not on File         Temp:  [98.5 °F (36.9 °C)] 98.5 °F (36.9 °C)  Heart Rate:  [86] 86  Resp:  [18] 18  BP: (195)/(110) 195/110  Neurological Exam  Mental Status  Alert. " Oriented to person, place and time. Speech is normal. Language is fluent with no aphasia.    Cranial Nerves  CN II: Visual acuity is normal. Visual fields full to confrontation.  CN III, IV, VI: Extraocular movements intact bilaterally. Pupils equal round and reactive to light bilaterally.  CN V: Facial sensation is normal.  CN VII:  Left: There is central facial weakness.  CN VIII: Hearing appears intact.    Motor  Decreased muscle bulk throughout. Increased muscle tone. Strength is 5/5 throughout all four extremities.    Sensory  Sensation is intact to light touch, pinprick, vibration and proprioception in all four extremities.    Coordination    No dysmetria noted.    Gait    Not observed.      Physical Exam  Vitals and nursing note reviewed.   Constitutional:       General: He is not in acute distress.     Appearance: He is not ill-appearing or toxic-appearing.   HENT:      Head: Normocephalic.   Eyes:      Extraocular Movements: Extraocular movements intact.      Pupils: Pupils are equal, round, and reactive to light.   Pulmonary:      Effort: Pulmonary effort is normal.      Breath sounds: Normal breath sounds.   Neurological:      Mental Status: He is alert.      Cranial Nerves: Cranial nerve deficit present.      Sensory: No sensory deficit.      Motor: Motor strength is normal.No weakness.      Coordination: Coordination normal.   Psychiatric:         Mood and Affect: Mood normal.         Speech: Speech normal.         Behavior: Behavior normal.         Acute Stroke Data    Thrombolytic Inclusion / Exclusion Criteria    Time: 18:49 EDT  Person Administering Scale: JEN Villaseñor      YES NO INCLUSION CRITERIA CLASS I   [] [x]   Suspected diagnosis of acute ischemic stroke with measureable neurological deficit.  Low NIHSS with disabling stroke symptoms.   [] [x]   Onset of stroke symptoms < 3 hours before beginning treatment >/ 18 years old  Stroke symptom onset = time patient was last seen well or  without symptoms (LKW)   [x] []   Onset of symptoms between 3-4.5 hours: >/= 80 years old (safe Class IIa) with history of   both diabetes and prior CVA  (reasonable Class IIb) AND NIHSS </= 25  *If not eligible for IV Thrombolytic consider neuro intervention for LKW within 24 hours     YES NO EXCLUSION CRITERIA (CONTRAINDICATIONS) CLASS III EVIDENCE HARM   [] []   Blood pressure >185/110 medically refractory to IV medications   [] []   Active bleeding at a non-compressible site   [] []   Active intracranial hemorrhage (ICH)   [] []   Symptoms suggestive of subarachnoid hemorrhage (SAH)   [] []   GI bleed within 21 days   [] []   Ischemic stroke within 3 months   [] []   Severe head trauma within 3 months    [] []   Intracranial or intraspinal surgery within 3 months   [] []   Current GI malignancy   [] []   Intracranial neoplasm   [] []   Infective endocarditis   [] []   Aortic arch dissection   [] []   Active coagulopathy with  INR >1.7, platelets <100,000, PTT > 40 sec, PT > 15 sec   *For warfarin, administration can begin before blood tests resulted. Discontinue for above values.    [] []   Treatment dose* of LMWH (Lovenox) in last 24 hours  *prophylactic dosages are not a contraindication   [] []   Concurrent use of antiplatelet agents' glycoprotein inhibitors IIb/IIIa (Integrilin, etc.)   [] []   Thrombin or factor Xa inhibitors (Eliquis, Xarelto, Arixtra) taken in last 48 hours     YES NO CLASS II: AIS WITH THE FOLLOWING CONDITIONS -   TREATMENT RISKS SHOULD BE WEIGHED AGAINST POSSIBLE BENEFITS.    [] []   Major trauma in last 14 days, recent major surgery in last 14 days, intracranial arterial dissection, giant unruptured and unsecured intracranial aneurysm, pericarditis     [] []   The risks and benefits have been discussed with the patient or family related to the administration of IV thrombolytic therapy for stroke symptoms.   [] []   I have discussed and reviewed the patient's case and imaging with the  attending prior to IV thrombolytic therapy.   TIME N/A Time IV thrombolytic administered       Hospital Meds:  Scheduled-    Infusions-     PRNs-   sodium chloride    Functional Status Prior to Current Stroke/[place] Score: 4    NIH Stroke Scale  Time: 18:23 EDT  Person Administering Scale: JEN Villaseñor    Interval: baseline  1a. Level of Consciousness: 0-->Alert, keenly responsive  1b. LOC Questions: 0-->Answers both questions correctly  1c. LOC Commands: 0-->Performs both tasks correctly  2. Best Gaze: 0-->Normal  3. Visual: 0-->No visual loss  4. Facial Palsy: 0-->Normal symmetrical movements  5a. Motor Arm, Left: 0-->No drift, limb holds 90 (or 45) degrees for full 10 secs  5b. Motor Arm, Right: 0-->No drift, limb holds 90 (or 45) degrees for full 10 secs  6a. Motor Leg, Left: 0-->No drift, leg holds 30 degree position for full 5 secs  6b. Motor Leg, Right: 0-->No drift, leg holds 30 degree position for full 5 secs  7. Limb Ataxia: 0-->Absent  8. Sensory: 1-->Mild-to-moderate sensory loss, patient feels pinprick is less sharp or is dull on the affected side, or there is a loss of superficial pain with pinprick, but patient is aware of being touched  9. Best Language: 0-->No aphasia, normal  10. Dysarthria: 0-->Normal  11. Extinction and Inattention (formerly Neglect): 0-->No abnormality    Total (NIH Stroke Scale): 1      Results Reviewed:  I have personally reviewed current lab, radiology, and data.  WBC   Date Value Ref Range Status   06/03/2025 6.45 3.40 - 10.80 10*3/mm3 Final     RBC   Date Value Ref Range Status   06/03/2025 4.15 4.14 - 5.80 10*6/mm3 Final     Hemoglobin   Date Value Ref Range Status   06/03/2025 13.3 13.0 - 17.7 g/dL Final   06/03/2025 13.9 12.0 - 17.0 g/dL Final     Comment:     Serial Number: [id number]:  [id number]     Hematocrit   Date Value Ref Range Status   06/03/2025 41.7 37.5 - 51.0 % Final   06/03/2025 41 38 - 51 % Final     MCV   Date Value Ref Range Status   06/03/2025  100.5 (H) 79.0 - 97.0 fL Final     MCH   Date Value Ref Range Status   06/03/2025 32.0 26.6 - 33.0 pg Final     MCHC   Date Value Ref Range Status   06/03/2025 31.9 31.5 - 35.7 g/dL Final     RDW   Date Value Ref Range Status   06/03/2025 13.2 12.3 - 15.4 % Final     RDW-SD   Date Value Ref Range Status   06/03/2025 49.1 37.0 - 54.0 fl Final     MPV   Date Value Ref Range Status   06/03/2025 9.3 6.0 - 12.0 fL Final     Platelets   Date Value Ref Range Status   06/03/2025 152 140 - 450 10*3/mm3 Final     Neutrophil %   Date Value Ref Range Status   06/03/2025 70.4 42.7 - 76.0 % Final     Lymphocyte %   Date Value Ref Range Status   06/03/2025 14.7 (L) 19.6 - 45.3 % Final     Monocyte %   Date Value Ref Range Status   06/03/2025 12.6 (H) 5.0 - 12.0 % Final     Eosinophil %   Date Value Ref Range Status   06/03/2025 1.4 0.3 - 6.2 % Final     Basophil %   Date Value Ref Range Status   06/03/2025 0.6 0.0 - 1.5 % Final     Immature Grans %   Date Value Ref Range Status   06/03/2025 0.3 0.0 - 0.5 % Final     Neutrophils, Absolute   Date Value Ref Range Status   06/03/2025 4.54 1.70 - 7.00 10*3/mm3 Final     Lymphocytes, Absolute   Date Value Ref Range Status   06/03/2025 0.95 0.70 - 3.10 10*3/mm3 Final     Monocytes, Absolute   Date Value Ref Range Status   06/03/2025 0.81 0.10 - 0.90 10*3/mm3 Final     Eosinophils, Absolute   Date Value Ref Range Status   06/03/2025 0.09 0.00 - 0.40 10*3/mm3 Final     Basophils, Absolute   Date Value Ref Range Status   06/03/2025 0.04 0.00 - 0.20 10*3/mm3 Final     Immature Grans, Absolute   Date Value Ref Range Status   06/03/2025 0.02 0.00 - 0.05 10*3/mm3 Final     nRBC   Date Value Ref Range Status   06/03/2025 0.0 0.0 - 0.2 /100 WBC Final     Lab Results   Component Value Date    CREATININE 2.00 (H) 06/03/2025    AST 14 06/03/2025    EGFR 32.9 (L) 06/03/2025     CT Angiogram Head w AI Analysis of LVO  Result Date: 6/3/2025  Impression: No core infarct or ischemic tissue at risk.  Grossly symmetric cerebral perfusion. No abrupt cut off/large vessel occlusion, flow-limiting stenosis, dissection, or aneurysm. There is atherosclerosis of the bilateral carotid bifurcations and proximal cervical ICAs without evidence of flow-limiting stenosis (<50% narrowing per NASCET criteria). Electronically Signed: Kristopher Zepeda MD  6/3/2025 7:18 PM EDT  Workstation ID: [id number]    CT Angiogram Neck  Result Date: 6/3/2025  Impression: No core infarct or ischemic tissue at risk. Grossly symmetric cerebral perfusion. No abrupt cut off/large vessel occlusion, flow-limiting stenosis, dissection, or aneurysm. There is atherosclerosis of the bilateral carotid bifurcations and proximal cervical ICAs without evidence of flow-limiting stenosis (<50% narrowing per NASCET criteria). Electronically Signed: Kristopher Zepeda MD  6/3/2025 7:18 PM EDT  Workstation ID: [id number]    CT CEREBRAL PERFUSION WITH & WITHOUT CONTRAST  Result Date: 6/3/2025  Impression: No core infarct or ischemic tissue at risk. Grossly symmetric cerebral perfusion. No abrupt cut off/large vessel occlusion, flow-limiting stenosis, dissection, or aneurysm. There is atherosclerosis of the bilateral carotid bifurcations and proximal cervical ICAs without evidence of flow-limiting stenosis (<50% narrowing per NASCET criteria). Electronically Signed: Kristopher eZpeda MD  6/3/2025 7:18 PM EDT  Workstation ID: [id number]    CT Head Without Contrast Stroke Protocol  Result Date: 6/3/2025  Impression: 1. Chronic appearing changes of the aging brain. No evidence of acute intracranial disease is seen. 2. 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 The posterior fusion anomalies resemble subacute or old trauma but are smooth margined and perfectly symmetric on the right and left and favored to be developmental variant. Electronically Signed: Aaron Vergara MD  6/3/2025 6:27 PM EDT  Workstation ID:  [id number]      Assessment/Plan:    This is a 80-year-old male with past medical history of HTN, Parkinson's disease, and hyperlipidemia presents [facility] emergency department via EMS from home for further evaluation of left facial droop and speech difficulty.  CT head revealed no acute intracranial abnormalities chronic appearing changes of the aging brain seen.  CTA head/neck reveals mild atherosclerotic disease no LVO or flow-limiting stenosis.  Negative for any core infarct or ischemic penumbra at risk.  He will be admitted to the hospital service for further evaluation.    Antiplatelet PTA: Aspirin 81 mg  Anticoagulant PTA: None        Speech difficulty and facial droop  Differential diagnosis includes CVA/TIA versus hypertensive urgency versus metabolic disarrangement  -TIA/CVA order set without thrombolytic therapy has been initiated  -NPO until bedside nursing dysphagia screen completed  -MRI brain pending  -TTE pending  -A1c and lipid panel in AM  -Meds: Aspirin 81 mg daily  -Activity as tolerated, fall risk precautions  -PT/OT/SLP evaluation    2.  Essential hypertension  -Allow autoregulation of blood pressure for adequate cerebral blood flow, goal SBP <220    3.  Hyperlipidemia  -Lipid panel in AM  -Atorvastatin 80mg nightly    Plan of care was discussed with Dr. Craft, patient and his family at the bedside.  Stroke neurology will continue to follow.  Please call with any questions or concerns.  Thank you for this consult.      Kaylah Richardson, JEN  Damaris 3, 2025  18:49 EDT

## 2025-06-03 NOTE — ED PROVIDER NOTES
EMERGENCY DEPARTMENT ENCOUNTER    Pt Name: Lane Dong  MRN: [id number]  Pt :   1945  Room Number:    Date of encounter:  6/3/2025  PCP: Elbert King MD  ED Provider: Dominick Craft MD    Historian: Patient and paramedic      HPI:  Chief Complaint: Speech abnormality        Context: Lane Dong is a 80 y.o. male who presents to the ED c/o speech abnormality with last known well at 1 PM today.  The 911 call apparently came out for the patient being incoherent.  Paramedics found that he was responsive and interactive but had difficult time with speech, some word salad.  No known prior history of seizure or CVA.  Patient was found to be hypertensive and given a dose of metoprolol.  Patient takes aspirin on a daily basis.  Patient has a history of Parkinson's disease.    PAST MEDICAL HISTORY  No past medical history on file.      PAST SURGICAL HISTORY  No past surgical history on file.      FAMILY HISTORY  No family history on file.      SOCIAL HISTORY  Social History     Socioeconomic History    Marital status:          ALLERGIES  Patient has no known allergies.        REVIEW OF SYSTEMS  Review of Systems     Unable secondary to communication abnormality.    PHYSICAL EXAM    I have reviewed the triage vital signs and nursing notes.    ED Triage Vitals   Temp Pulse Resp BP SpO2   -- -- -- -- --      Temp src Heart Rate Source Patient Position BP Location FiO2 (%)   -- -- -- -- --       Physical Exam  GENERAL:   Appears a little anxious, with constant tremors of his upper extremities, able to communicate and answer questions though.  HENT: Nares patent.  No facial asymmetry  EYES: No scleral icterus.  CV: Regular rhythm, regular rate.  No murmurs gallops rubs  RESPIRATORY: Normal effort.  No audible wheezes, rales or rhonchi.  Clear to auscultation  ABDOMEN: Soft, nontender  MUSCULOSKELETAL: No deformities.   NEURO: Alert, moves all extremities, follows commands.  See stroke  navigator note for detailed NIH stroke score  SKIN: Warm, dry, no rash visualized.      LAB RESULTS  Recent Results (from the past 24 hours)   POC CHEM 8    Collection Time: 06/03/25  6:23 PM    Specimen: Blood   Result Value Ref Range    Glucose 127 70 - 130 mg/dL    BUN 31 (H) 8 - 26 mg/dL    Creatinine 2.00 (H) 0.60 - 1.30 mg/dL    Sodium 140 138 - 146 mmol/L    POC Potassium 4.4 3.5 - 4.9 mmol/L    Chloride 102 98 - 109 mmol/L    Total CO2 25 24 - 29 mmol/L    Hemoglobin 13.9 12.0 - 17.0 g/dL    Hematocrit 41 38 - 51 %    Ionized Calcium 1.21 1.20 - 1.32 mmol/L    eGFR 32.9 (L) >60.0 mL/min/1.73   Protime-INR    Collection Time: 06/03/25  6:24 PM    Specimen: Blood   Result Value Ref Range    Protime 14.6 12.2 - 15.3 Seconds    INR 1.08 0.89 - 1.12   aPTT    Collection Time: 06/03/25  6:24 PM    Specimen: Blood   Result Value Ref Range    PTT 36.0 22.0 - 39.0 seconds   AST    Collection Time: 06/03/25  6:24 PM    Specimen: Blood   Result Value Ref Range    AST (SGOT) 14 1 - 40 U/L   ALT    Collection Time: 06/03/25  6:24 PM    Specimen: Blood   Result Value Ref Range    ALT (SGPT) <5 1 - 41 U/L   Green Top (Gel)    Collection Time: 06/03/25  6:24 PM   Result Value Ref Range    Extra Tube Hold for add-ons.    Lavender Top    Collection Time: 06/03/25  6:24 PM   Result Value Ref Range    Extra Tube hold for add-on    Gold Top - SST    Collection Time: 06/03/25  6:24 PM   Result Value Ref Range    Extra Tube Hold for add-ons.    Gray Top    Collection Time: 06/03/25  6:24 PM   Result Value Ref Range    Extra Tube Hold for add-ons.    Light Blue Top    Collection Time: 06/03/25  6:24 PM   Result Value Ref Range    Extra Tube Hold for add-ons.    CBC Auto Differential    Collection Time: 06/03/25  6:24 PM    Specimen: Blood   Result Value Ref Range    WBC 6.45 3.40 - 10.80 10*3/mm3    RBC 4.15 4.14 - 5.80 10*6/mm3    Hemoglobin 13.3 13.0 - 17.7 g/dL    Hematocrit 41.7 37.5 - 51.0 %    .5 (H) 79.0 - 97.0 fL     MCH 32.0 26.6 - 33.0 pg    MCHC 31.9 31.5 - 35.7 g/dL    RDW 13.2 12.3 - 15.4 %    RDW-SD 49.1 37.0 - 54.0 fl    MPV 9.3 6.0 - 12.0 fL    Platelets 152 140 - 450 10*3/mm3    Neutrophil % 70.4 42.7 - 76.0 %    Lymphocyte % 14.7 (L) 19.6 - 45.3 %    Monocyte % 12.6 (H) 5.0 - 12.0 %    Eosinophil % 1.4 0.3 - 6.2 %    Basophil % 0.6 0.0 - 1.5 %    Immature Grans % 0.3 0.0 - 0.5 %    Neutrophils, Absolute 4.54 1.70 - 7.00 10*3/mm3    Lymphocytes, Absolute 0.95 0.70 - 3.10 10*3/mm3    Monocytes, Absolute 0.81 0.10 - 0.90 10*3/mm3    Eosinophils, Absolute 0.09 0.00 - 0.40 10*3/mm3    Basophils, Absolute 0.04 0.00 - 0.20 10*3/mm3    Immature Grans, Absolute 0.02 0.00 - 0.05 10*3/mm3    nRBC 0.0 0.0 - 0.2 /100 WBC   ECG 12 Lead ED Triage Standing Order; Acute Stroke (Onset <24 hrs)    Collection Time: 06/03/25  6:56 PM   Result Value Ref Range    QT Interval 400 ms    QTC Interval 467 ms       If labs were ordered, I independently reviewed the results and considered them in treating the patient.        RADIOLOGY  XR Chest 1 View  Result Date: 6/3/2025  XR CHEST 1 VW Date of Exam: 6/3/2025 6:45 PM EDT Indication: Acute Stroke Protocol (onset < 12 hrs) Comparison: None available. Findings: Patient is rotated to the left. Heart shadow appears in the upper range of normal size. Pulmonary vasculature appears upper normal as well. There is mild generalized coarsening of the pulmonary interstitial markings which may be chronic. There appears to  be a small area of discoid atelectasis in the left lung base. No other focal lung disease is seen. No edema, effusion or pneumothorax is seen.     Impression: 1. Borderline heart shadow enlargement and mild pulmonary venous hypertension. 2. Mild left basilar discoid atelectasis and mild nonspecific pulmonary interstitial changes elsewhere which may be chronic. Electronically Signed: Aaron Vergara MD  6/3/2025 7:43 PM EDT  Workstation ID: [id number]    CT Angiogram Head w AI Analysis of  LVO  Result Date: 6/3/2025  CT CEREBRAL PERFUSION W WO CONTRAST, CT ANGIOGRAM NECK, CT ANGIOGRAM HEAD W AI ANALYSIS OF LVO Date of Exam: 6/3/2025 6:27 PM EDT Indication: Neuro Deficit, acute, Stroke suspected Neuro deficit, acute stroke suspected.  Comparison: Concurrent noncontrast head CT Technique: Axial CT images of the brain were obtained prior to and after the administration of 115 mL Isovue-370. Core blood volume, core blood flow, mean transit time, and Tmax images were obtained utilizing the Rapid software protocol. A limited CT angiogram of the head was also performed to measure the blood vessel density. CTA of the head and neck was performed after the uneventful intravenous administration of above contrast. Reconstructed coronal and sagittal images were also obtained. In addition, a 3-D volume rendered image was created for interpretation. Automated exposure control and iterative reconstruction methods were used. The radiation dose reduction device was turned on for each scan per the ALARA (As Low as Reasonably Achievable) protocol. Findings: CT cerebral perfusion: No core infarct or ischemic tissue at risk. Grossly symmetric cerebral perfusion. Mild patchy hypoperfusion in the watershed regions. CTA HEAD: No abrupt cut off/large vessel occlusion, flow-limiting stenosis, dissection, or aneurysm. The cerebral veins and dural venous sinuses are grossly patent. There is atherosclerosis of the bilateral carotid siphons without flow-limiting stenosis. There is atherosclerosis of the right greater than left V4 segments without flow-limiting stenosis. CTA NECK: No abrupt cut off/large vessel occlusion, flow-limiting stenosis, dissection, or aneurysm. There is atherosclerosis at the bilateral carotid bifurcations and proximal cervical ICAs which results in some luminal irregularity without evidence of flow-limiting stenosis (<50% narrowing per NASCET criteria). There is some tortuosity of the bilateral cervical  ICAs which can be seen with longstanding hypertension. There is some atherosclerosis at the aortic arch and left subclavian artery origin. Extravascular findings: Lung apices are grossly clear. The patient is edentulous. 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 No acute or suspicious bony findings. There is bony fusion of the vertebral bodies and posterior elements of C2 and C3.     Impression: No core infarct or ischemic tissue at risk. Grossly symmetric cerebral perfusion. No abrupt cut off/large vessel occlusion, flow-limiting stenosis, dissection, or aneurysm. There is atherosclerosis of the bilateral carotid bifurcations and proximal cervical ICAs without evidence of flow-limiting stenosis (<50% narrowing per NASCET criteria). Electronically Signed: Kristopher Zepeda MD  6/3/2025 7:18 PM EDT  Workstation ID: [id number]    CT Angiogram Neck  Result Date: 6/3/2025  CT CEREBRAL PERFUSION W WO CONTRAST, CT ANGIOGRAM NECK, CT ANGIOGRAM HEAD W AI ANALYSIS OF LVO Date of Exam: 6/3/2025 6:27 PM EDT Indication: Neuro Deficit, acute, Stroke suspected Neuro deficit, acute stroke suspected.  Comparison: Concurrent noncontrast head CT Technique: Axial CT images of the brain were obtained prior to and after the administration of 115 mL Isovue-370. Core blood volume, core blood flow, mean transit time, and Tmax images were obtained utilizing the Rapid software protocol. A limited CT angiogram of the head was also performed to measure the blood vessel density. CTA of the head and neck was performed after the uneventful intravenous administration of above contrast. Reconstructed coronal and sagittal images were also obtained. In addition, a 3-D volume rendered image was created for interpretation. Automated exposure control and iterative reconstruction methods were used. The radiation dose reduction device was turned on for each scan per the ALARA (As Low as Reasonably Achievable) protocol. Findings: CT cerebral  perfusion: No core infarct or ischemic tissue at risk. Grossly symmetric cerebral perfusion. Mild patchy hypoperfusion in the watershed regions. CTA HEAD: No abrupt cut off/large vessel occlusion, flow-limiting stenosis, dissection, or aneurysm. The cerebral veins and dural venous sinuses are grossly patent. There is atherosclerosis of the bilateral carotid siphons without flow-limiting stenosis. There is atherosclerosis of the right greater than left V4 segments without flow-limiting stenosis. CTA NECK: No abrupt cut off/large vessel occlusion, flow-limiting stenosis, dissection, or aneurysm. There is atherosclerosis at the bilateral carotid bifurcations and proximal cervical ICAs which results in some luminal irregularity without evidence of flow-limiting stenosis (<50% narrowing per NASCET criteria). There is some tortuosity of the bilateral cervical ICAs which can be seen with longstanding hypertension. There is some atherosclerosis at the aortic arch and left subclavian artery origin. Extravascular findings: Lung apices are grossly clear. The patient is edentulous. 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 No acute or suspicious bony findings. There is bony fusion of the vertebral bodies and posterior elements of C2 and C3.     Impression: No core infarct or ischemic tissue at risk. Grossly symmetric cerebral perfusion. No abrupt cut off/large vessel occlusion, flow-limiting stenosis, dissection, or aneurysm. There is atherosclerosis of the bilateral carotid bifurcations and proximal cervical ICAs without evidence of flow-limiting stenosis (<50% narrowing per NASCET criteria). Electronically Signed: Kristopher Zepeda MD  6/3/2025 7:18 PM EDT  Workstation ID: [id number]    CT CEREBRAL PERFUSION WITH & WITHOUT CONTRAST  Result Date: 6/3/2025  CT CEREBRAL PERFUSION W WO CONTRAST, CT ANGIOGRAM NECK, CT ANGIOGRAM HEAD W AI ANALYSIS OF LVO Date of Exam: 6/3/2025 6:27 PM EDT Indication: Neuro Deficit,  acute, Stroke suspected Neuro deficit, acute stroke suspected.  Comparison: Concurrent noncontrast head CT Technique: Axial CT images of the brain were obtained prior to and after the administration of 115 mL Isovue-370. Core blood volume, core blood flow, mean transit time, and Tmax images were obtained utilizing the Rapid software protocol. A limited CT angiogram of the head was also performed to measure the blood vessel density. CTA of the head and neck was performed after the uneventful intravenous administration of above contrast. Reconstructed coronal and sagittal images were also obtained. In addition, a 3-D volume rendered image was created for interpretation. Automated exposure control and iterative reconstruction methods were used. The radiation dose reduction device was turned on for each scan per the ALARA (As Low as Reasonably Achievable) protocol. Findings: CT cerebral perfusion: No core infarct or ischemic tissue at risk. Grossly symmetric cerebral perfusion. Mild patchy hypoperfusion in the watershed regions. CTA HEAD: No abrupt cut off/large vessel occlusion, flow-limiting stenosis, dissection, or aneurysm. The cerebral veins and dural venous sinuses are grossly patent. There is atherosclerosis of the bilateral carotid siphons without flow-limiting stenosis. There is atherosclerosis of the right greater than left V4 segments without flow-limiting stenosis. CTA NECK: No abrupt cut off/large vessel occlusion, flow-limiting stenosis, dissection, or aneurysm. There is atherosclerosis at the bilateral carotid bifurcations and proximal cervical ICAs which results in some luminal irregularity without evidence of flow-limiting stenosis (<50% narrowing per NASCET criteria). There is some tortuosity of the bilateral cervical ICAs which can be seen with longstanding hypertension. There is some atherosclerosis at the aortic arch and left subclavian artery origin. Extravascular findings: Lung apices are grossly  clear. The patient is edentulous. 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 No acute or suspicious bony findings. There is bony fusion of the vertebral bodies and posterior elements of C2 and C3.     Impression: No core infarct or ischemic tissue at risk. Grossly symmetric cerebral perfusion. No abrupt cut off/large vessel occlusion, flow-limiting stenosis, dissection, or aneurysm. There is atherosclerosis of the bilateral carotid bifurcations and proximal cervical ICAs without evidence of flow-limiting stenosis (<50% narrowing per NASCET criteria). Electronically Signed: Kristopher Zepeda MD  6/3/2025 7:18 PM EDT  Workstation ID: [id number]    CT Head Without Contrast Stroke Protocol  Result Date: 6/3/2025  CT HEAD WO CONTRAST STROKE PROTOCOL Date of Exam: 6/3/2025 6:14 PM EDT Indication: Neuro deficit, acute, stroke suspected Neuro Deficit, acute, Stroke suspected. Comparison: No previous exams currently available. Technique: Axial CT images were obtained of the head without contrast administration.  Reconstructed coronal images were also obtained. Automated exposure control and iterative construction methods were used. Scan Time: 1814 Results discussed with JEN Jang at 1819, 6/3/2025. Findings: The calvarium appears intact. Included paranasal sinuses and mastoids appear clear. Symmetric irregularities of the ring of C1, along the right and left lateral margins of the lamina, and also anterior midline of the ring of C1 respectively noted on images 12 and 14. These all appear to represent fusion anomalies with incomplete bony fusion, rather than acute trauma. Orbits appear grossly normal. There is expected degree of generalized cerebral atrophy for age. There is no evidence of hemorrhage, contusion, or edema, mass or mass effect, hydrocephalus, or abnormal extra-axial collection. What initially appears to be a small lacunar type infarct in the medial and superior left thalamus is actually  volume averaging artifact with adjacent CSF. There is no evidence of acute infarct and no definite old infarct.     Impression: 1. Chronic appearing changes of the aging brain. No evidence of acute intracranial disease is seen. 2. 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 The posterior fusion anomalies resemble subacute or old trauma but are smooth margined and perfectly symmetric on the right and left and favored to be developmental variant. Electronically Signed: Aaron Vergara MD  6/3/2025 6:27 PM EDT  Workstation ID: [id number]      I ordered and independently reviewed the above noted radiographic studies.      I viewed images of CT head which showed no intracranial hemorrhage per my independent interpretation.    See radiologist's dictation for official interpretation.        PROCEDURES    Critical Care    Performed by: Dominick Craft MD  Authorized by: Dominick Craft MD    Critical care provider statement:     Critical care time (minutes):  35    Critical care time was exclusive of:  Separately billable procedures and treating other patients    Critical care was necessary to treat or prevent imminent or life-threatening deterioration of the following conditions:  CNS failure or compromise    Critical care was time spent personally by me on the following activities:  Ordering and performing treatments and interventions, ordering and review of laboratory studies, ordering and review of radiographic studies, pulse oximetry, re-evaluation of patient's condition, review of old charts, obtaining history from patient or surrogate, examination of patient, evaluation of patient's response to treatment, discussions with consultants and development of treatment plan with patient or surrogate  Comments:      I attended to this patient immediately upon arrival and accompanied the paramedics to the CT scanner where I further evaluate the patient.  I determined that the patient's  last known well was greater than 4.5 hours and therefore IV thrombolytics would be contraindicated.  We evaluated the patient for large vessel occlusion/Cath Lab intervention need but did not find this as well.  The patient's symptoms are markedly improved.  He will need to be admitted for further care and observation.      ECG 12 Lead ED Triage Standing Order; Acute Stroke (Onset <24 hrs)   Final Result   Test Reason : ED Triage Standing Order~   Blood Pressure :   */*   mmHG   Vent. Rate :  82 BPM     Atrial Rate : 300 BPM      P-R Int :   * ms          QRS Dur :  82 ms       QT Int : 400 ms       P-R-T Axes :   * -38  89 degrees     QTcB Int : 467 ms      Sinus rhythm with PACs   Left axis deviation   Minimal voltage criteria for LVH, may be normal variant   Nonspecific ST and T wave abnormality   Abnormal ECG   No previous ECGs available   Confirmed by LORETO GARCIA MD ([id number]) on 6/3/2025 7:55:45 PM      Referred By: EDMD           Confirmed By: LORETO GARCIA MD          MEDICATIONS GIVEN IN ER    Medications   sodium chloride 0.9 % flush 10 mL (has no administration in time range)   aspirin tablet 325 mg (325 mg Oral Given 6/3/25 1956)     Or   aspirin suppository 300 mg ( Rectal Not Given:  See Alt 6/3/25 1956)   Pharmacy to Dose - Crestor (has no administration in time range)   rosuvastatin (CRESTOR) tablet 20 mg (has no administration in time range)   iopamidol (ISOVUE-370) 76 % injection 115 mL (115 mL Intravenous Given 6/3/25 1841)         MEDICAL DECISION MAKING, PROGRESS, and CONSULTS    All labs, if obtained, have been independently reviewed by me.  All radiology studies, if obtained, have been reviewed by me and the radiologist dictating the report.  All EKGs, if obtained, have been independently viewed and interpreted by me/my attending physician.      Discussion below represents my analysis of pertinent findings related to patient's condition, differential diagnosis, treatment plan and final  disposition.                            Total (NIH Stroke Scale): 1             Differential diagnosis:    Acute CVA versus TIA versus metabolic encephalopathy, etc.      Additional sources:    - Discussed/ obtained information from independent historians: I spoke with paramedics while accompanying them to the CT scanner where upon their arrival at our facility.    - External (non-ED) record review: I queried the system but find no records even in epic care.    - Chronic or social conditions impacting care: Parkinson's disease        Orders placed during this visit:  Orders Placed This Encounter   Procedures    Critical Care    CT Head Without Contrast Stroke Protocol    CT Angiogram Head w AI Analysis of LVO    CT Angiogram Neck    CT CEREBRAL PERFUSION WITH & WITHOUT CONTRAST    XR Chest 1 View    [place] Draw    Protime-INR    aPTT    AST    ALT    CBC Auto Differential    Urinalysis With Microscopic If Indicated (No Culture) - Urine, Clean Catch    NPO Diet NPO Type: Strict NPO    Initiate Code Stroke    Perform NIH Stroke Scale    Measure Actual Weight    Head of Bed 30 Degrees or Less    Undress and Gown    Continuous Pulse Oximetry    Vital Signs    Neuro Checks    Notify Provider SBP <80 or >200    Notify Provider for SBP > 140 if Hemorrhagic Stroke    No Hypotonic Fluids    Nursing Dysphagia Screening (Complete Prior to Giving anything PO)    RN to Place Order SLP Consult (IF swallow screen failed) - Eval & Treat Choosing Reason of RN Dysphagia Screen Failed    Notify Provider    Nursing Dysphagia Screening (Complete Prior to Giving Anything By Mouth)    RN to Place Order SLP Consult - Eval & Treat Choosing Reason of RN Dysphagia Screen Failed    Nurse to Call MD or Nutrition Services for Diet if Patient Passes Dysphagia Screen    Activity As Tolerated    Inpatient Neurology Consult Stroke    Oxygen Therapy- Nasal Cannula; Titrate 1-6 LPM Per SpO2; 90 - 95%    POC CHEM 8    ECG 12 Lead ED Triage Standing  Order; Acute Stroke (Onset <24 hrs)    Insert Large-Bore Peripheral IV - Right AC Preferred    CBC & Differential    Green Top (Gel)    Lavender Top    Gold Top - SST    Gray Top    Light Blue Top         Additional orders considered but not ordered:  MRI brain which can be ordered by consulting team    ED Course:    Consultants: Stroke neurology team      ED Course as of 06/03/25 2000 Tue Jun 03, 2025   1822 Per EMS report to me, prehospital blood pressure 228/124 prior to metoprolol being administered. [MS]   1959 I have contacted Dr. Rodriguez, hospitalist for admission. [MS]      ED Course User Index  [MS] Dominick Craft MD              Shared Decision Making:  After my consideration of clinical presentation and any laboratory/radiology studies obtained, I discussed the findings with the patient/patient representative who is in agreement with the treatment plan and the final disposition.   Risks and benefits of discharge and/or observation/admission were discussed.       AS OF 20:00 EDT VITALS:    BP - (!) 199/97  HR - 87  TEMP - 98.5 °F (36.9 °C) (Axillary)  O2 SATS - 96%                  DIAGNOSIS  Final diagnoses:   Aphasia   Uncontrolled hypertension         DISPOSITION  Admission      Please note that portions of this document were completed with voice recognition software.        Dominick Craft MD  06/03/25 [id number]

## 2025-06-04 ENCOUNTER — APPOINTMENT (OUTPATIENT)
Dept: CARDIOLOGY | Facility: HOSPITAL | Age: 80
End: 2025-06-04
Payer: MEDICARE

## 2025-06-04 PROBLEM — E44.0 MODERATE PROTEIN-CALORIE MALNUTRITION: Status: ACTIVE | Noted: 2025-06-04

## 2025-06-04 LAB
ANION GAP SERPL CALCULATED.3IONS-SCNC: 11 MMOL/L (ref 5–15)
AORTIC DIMENSIONLESS INDEX: 0.75 (DI)
ASCENDING AORTA: 3.3 CM
AV MEAN PRESS GRAD SYS DOP V1V2: 4.7 MMHG
AV VMAX SYS DOP: 144.7 CM/SEC
BH CV ECHO MEAS - AO MAX PG: 8.4 MMHG
BH CV ECHO MEAS - AO ROOT DIAM: 3.2 CM
BH CV ECHO MEAS - AO V2 VTI: 31.9 CM
BH CV ECHO MEAS - AVA(I,D): 2.35 CM2
BH CV ECHO MEAS - EDV(CUBED): 97.3 ML
BH CV ECHO MEAS - EDV(MOD-SP2): 144 ML
BH CV ECHO MEAS - EDV(MOD-SP4): 132 ML
BH CV ECHO MEAS - EF(MOD-SP2): 55.8 %
BH CV ECHO MEAS - EF(MOD-SP4): 76.7 %
BH CV ECHO MEAS - ESV(CUBED): 22 ML
BH CV ECHO MEAS - ESV(MOD-SP2): 63.6 ML
BH CV ECHO MEAS - ESV(MOD-SP4): 30.7 ML
BH CV ECHO MEAS - FS: 39.1 %
BH CV ECHO MEAS - IVS/LVPW: 0.82 CM
BH CV ECHO MEAS - IVSD: 0.9 CM
BH CV ECHO MEAS - LA DIMENSION: 4.8 CM
BH CV ECHO MEAS - LAT PEAK E' VEL: 5.1 CM/SEC
BH CV ECHO MEAS - LV DIASTOLIC VOL/BSA (35-75): 65.6 CM2
BH CV ECHO MEAS - LV MASS(C)D: 158.8 GRAMS
BH CV ECHO MEAS - LV MAX PG: 5.3 MMHG
BH CV ECHO MEAS - LV MEAN PG: 2.8 MMHG
BH CV ECHO MEAS - LV SYSTOLIC VOL/BSA (12-30): 15.3 CM2
BH CV ECHO MEAS - LV V1 MAX: 114.5 CM/SEC
BH CV ECHO MEAS - LV V1 VTI: 23.9 CM
BH CV ECHO MEAS - LVIDD: 4.6 CM
BH CV ECHO MEAS - LVIDS: 2.8 CM
BH CV ECHO MEAS - LVOT AREA: 3.1 CM2
BH CV ECHO MEAS - LVOT DIAM: 2 CM
BH CV ECHO MEAS - LVPWD: 1.1 CM
BH CV ECHO MEAS - MED PEAK E' VEL: 4.9 CM/SEC
BH CV ECHO MEAS - MV A MAX VEL: 63.9 CM/SEC
BH CV ECHO MEAS - MV DEC SLOPE: 310 CM/SEC2
BH CV ECHO MEAS - MV DEC TIME: 0.27 SEC
BH CV ECHO MEAS - MV E MAX VEL: 71.8 CM/SEC
BH CV ECHO MEAS - MV E/A: 1.12
BH CV ECHO MEAS - MV P1/2T: 91 MSEC
BH CV ECHO MEAS - MVA(P1/2T): 2.42 CM2
BH CV ECHO MEAS - PA ACC TIME: 0.1 SEC
BH CV ECHO MEAS - PA V2 MAX: 100 CM/SEC
BH CV ECHO MEAS - RAP SYSTOLE: 8 MMHG
BH CV ECHO MEAS - RVSP: 23 MMHG
BH CV ECHO MEAS - SV(LVOT): 75.1 ML
BH CV ECHO MEAS - SV(MOD-SP2): 80.4 ML
BH CV ECHO MEAS - SV(MOD-SP4): 101.3 ML
BH CV ECHO MEAS - SVI(LVOT): 37.3 ML/M2
BH CV ECHO MEAS - SVI(MOD-SP2): 39.9 ML/M2
BH CV ECHO MEAS - SVI(MOD-SP4): 50.3 ML/M2
BH CV ECHO MEAS - TAPSE (>1.6): 2.38 CM
BH CV ECHO MEAS - TR MAX PG: 15.2 MMHG
BH CV ECHO MEAS - TR MAX VEL: 195 CM/SEC
BH CV ECHO MEASUREMENTS AVERAGE E/E' RATIO: 14.36
BH CV VAS BP LEFT ARM: NORMAL MMHG
BH CV XLRA - RV BASE: 5.3 CM
BH CV XLRA - RV LENGTH: 7.7 CM
BH CV XLRA - RV MID: 4.3 CM
BH CV XLRA - TDI S': 12 CM/SEC
BUN SERPL-MCNC: 29.3 MG/DL (ref 8–23)
BUN/CREAT SERPL: 16.6 (ref 7–25)
CALCIUM SPEC-SCNC: 8.9 MG/DL (ref 8.6–10.5)
CHLORIDE SERPL-SCNC: 102 MMOL/L (ref 98–107)
CHOLEST SERPL-MCNC: 111 MG/DL (ref 0–200)
CO2 SERPL-SCNC: 26 MMOL/L (ref 22–29)
CREAT SERPL-MCNC: 1.77 MG/DL (ref 0.76–1.27)
CREAT UR-MCNC: 23.5 MG/DL
EGFRCR SERPLBLD CKD-EPI 2021: 38.1 ML/MIN/1.73
GLUCOSE BLDC GLUCOMTR-MCNC: 106 MG/DL (ref 70–130)
GLUCOSE BLDC GLUCOMTR-MCNC: 123 MG/DL (ref 70–130)
GLUCOSE BLDC GLUCOMTR-MCNC: 124 MG/DL (ref 70–130)
GLUCOSE SERPL-MCNC: 133 MG/DL (ref 65–99)
HBA1C MFR BLD: 5.8 % (ref 4.8–5.6)
HDLC SERPL-MCNC: 42 MG/DL (ref 40–60)
IVRT: 90 MS
LDLC SERPL CALC-MCNC: 52 MG/DL (ref 0–100)
LDLC/HDLC SERPL: 1.22 {RATIO}
LEFT ATRIUM VOLUME INDEX: 55.2 ML/M2
LV EF 2D ECHO EST: 67 %
LV EF BIPLANE MOD: 67.7 %
MAGNESIUM SERPL-MCNC: 2 MG/DL (ref 1.6–2.4)
POTASSIUM SERPL-SCNC: 4.3 MMOL/L (ref 3.5–5.2)
PROCALCITONIN SERPL-MCNC: 0.1 NG/ML (ref 0–0.25)
SODIUM SERPL-SCNC: 139 MMOL/L (ref 136–145)
TRIGL SERPL-MCNC: 89 MG/DL (ref 0–150)
UUN 24H UR-MCNC: 146 MG/DL
VLDLC SERPL-MCNC: 17 MG/DL (ref 5–40)

## 2025-06-04 PROCEDURE — 82948 REAGENT STRIP/BLOOD GLUCOSE: CPT

## 2025-06-04 PROCEDURE — 25010000002 CEFTRIAXONE PER 250 MG: Performed by: STUDENT IN AN ORGANIZED HEALTH CARE EDUCATION/TRAINING PROGRAM

## 2025-06-04 PROCEDURE — 80061 LIPID PANEL: CPT

## 2025-06-04 PROCEDURE — 93306 TTE W/DOPPLER COMPLETE: CPT | Performed by: INTERNAL MEDICINE

## 2025-06-04 PROCEDURE — 93010 ELECTROCARDIOGRAM REPORT: CPT | Performed by: INTERNAL MEDICINE

## 2025-06-04 PROCEDURE — 83735 ASSAY OF MAGNESIUM: CPT | Performed by: STUDENT IN AN ORGANIZED HEALTH CARE EDUCATION/TRAINING PROGRAM

## 2025-06-04 PROCEDURE — 93005 ELECTROCARDIOGRAM TRACING: CPT | Performed by: STUDENT IN AN ORGANIZED HEALTH CARE EDUCATION/TRAINING PROGRAM

## 2025-06-04 PROCEDURE — 83036 HEMOGLOBIN GLYCOSYLATED A1C: CPT

## 2025-06-04 PROCEDURE — 87040 BLOOD CULTURE FOR BACTERIA: CPT | Performed by: STUDENT IN AN ORGANIZED HEALTH CARE EDUCATION/TRAINING PROGRAM

## 2025-06-04 PROCEDURE — 25810000003 LACTATED RINGERS PER 1000 ML: Performed by: STUDENT IN AN ORGANIZED HEALTH CARE EDUCATION/TRAINING PROGRAM

## 2025-06-04 PROCEDURE — 97162 PT EVAL MOD COMPLEX 30 MIN: CPT

## 2025-06-04 PROCEDURE — 97166 OT EVAL MOD COMPLEX 45 MIN: CPT

## 2025-06-04 PROCEDURE — 93306 TTE W/DOPPLER COMPLETE: CPT

## 2025-06-04 PROCEDURE — 80048 BASIC METABOLIC PNL TOTAL CA: CPT | Performed by: STUDENT IN AN ORGANIZED HEALTH CARE EDUCATION/TRAINING PROGRAM

## 2025-06-04 PROCEDURE — 92523 SPEECH SOUND LANG COMPREHEN: CPT

## 2025-06-04 PROCEDURE — 25010000002 HEPARIN (PORCINE) PER 1000 UNITS: Performed by: STUDENT IN AN ORGANIZED HEALTH CARE EDUCATION/TRAINING PROGRAM

## 2025-06-04 PROCEDURE — 97530 THERAPEUTIC ACTIVITIES: CPT

## 2025-06-04 PROCEDURE — 99232 SBSQ HOSP IP/OBS MODERATE 35: CPT | Performed by: STUDENT IN AN ORGANIZED HEALTH CARE EDUCATION/TRAINING PROGRAM

## 2025-06-04 RX ORDER — SENNOSIDES 8.6 MG/1
1 TABLET ORAL 2 TIMES DAILY
Status: DISCONTINUED | OUTPATIENT
Start: 2025-06-04 | End: 2025-06-05 | Stop reason: HOSPADM

## 2025-06-04 RX ORDER — HYDRALAZINE HYDROCHLORIDE 20 MG/ML
10 INJECTION INTRAMUSCULAR; INTRAVENOUS ONCE
Status: COMPLETED | OUTPATIENT
Start: 2025-06-04 | End: 2025-06-05

## 2025-06-04 RX ORDER — ROSUVASTATIN CALCIUM 20 MG/1
20 TABLET, COATED ORAL DAILY
COMMUNITY

## 2025-06-04 RX ORDER — LISINOPRIL 10 MG/1
10 TABLET ORAL DAILY
COMMUNITY

## 2025-06-04 RX ORDER — QUETIAPINE FUMARATE 25 MG/1
12.5 TABLET, FILM COATED ORAL NIGHTLY
COMMUNITY

## 2025-06-04 RX ORDER — QUETIAPINE FUMARATE 25 MG/1
12.5 TABLET, FILM COATED ORAL NIGHTLY
Status: DISCONTINUED | OUTPATIENT
Start: 2025-06-04 | End: 2025-06-05 | Stop reason: HOSPADM

## 2025-06-04 RX ORDER — CARBIDOPA/LEVODOPA 25MG-250MG
1 TABLET ORAL 3 TIMES DAILY
COMMUNITY

## 2025-06-04 RX ORDER — BISACODYL 10 MG
10 SUPPOSITORY, RECTAL RECTAL DAILY PRN
Status: DISCONTINUED | OUTPATIENT
Start: 2025-06-04 | End: 2025-06-05 | Stop reason: HOSPADM

## 2025-06-04 RX ORDER — POLYETHYLENE GLYCOL 3350 17 G/17G
17 POWDER, FOR SOLUTION ORAL DAILY
Status: DISCONTINUED | OUTPATIENT
Start: 2025-06-04 | End: 2025-06-05 | Stop reason: HOSPADM

## 2025-06-04 RX ORDER — ASPIRIN 81 MG/1
81 TABLET, CHEWABLE ORAL DAILY
COMMUNITY

## 2025-06-04 RX ORDER — LEVOTHYROXINE SODIUM 25 UG/1
25 TABLET ORAL
Status: DISCONTINUED | OUTPATIENT
Start: 2025-06-04 | End: 2025-06-05 | Stop reason: HOSPADM

## 2025-06-04 RX ORDER — METOPROLOL SUCCINATE 100 MG/1
150 TABLET, EXTENDED RELEASE ORAL DAILY
COMMUNITY

## 2025-06-04 RX ADMIN — CARBIDOPA AND LEVODOPA 1 TABLET: 25; 100 TABLET ORAL at 15:40

## 2025-06-04 RX ADMIN — LEVOTHYROXINE SODIUM 25 MCG: 0.03 TABLET ORAL at 05:08

## 2025-06-04 RX ADMIN — Medication 10 ML: at 00:31

## 2025-06-04 RX ADMIN — SODIUM CHLORIDE, SODIUM LACTATE, POTASSIUM CHLORIDE, CALCIUM CHLORIDE 125 ML/HR: 20; 30; 600; 310 INJECTION, SOLUTION INTRAVENOUS at 18:11

## 2025-06-04 RX ADMIN — SENNOSIDES 1 TABLET: 8.6 TABLET, FILM COATED ORAL at 10:58

## 2025-06-04 RX ADMIN — HEPARIN SODIUM 5000 UNITS: 5000 INJECTION INTRAVENOUS; SUBCUTANEOUS at 21:22

## 2025-06-04 RX ADMIN — CARBIDOPA AND LEVODOPA 1 TABLET: 25; 100 TABLET ORAL at 21:22

## 2025-06-04 RX ADMIN — HEPARIN SODIUM 5000 UNITS: 5000 INJECTION INTRAVENOUS; SUBCUTANEOUS at 00:27

## 2025-06-04 RX ADMIN — SODIUM CHLORIDE, SODIUM LACTATE, POTASSIUM CHLORIDE, CALCIUM CHLORIDE 125 ML/HR: 20; 30; 600; 310 INJECTION, SOLUTION INTRAVENOUS at 00:28

## 2025-06-04 RX ADMIN — QUETIAPINE FUMARATE 12.5 MG: 25 TABLET ORAL at 21:22

## 2025-06-04 RX ADMIN — ROSUVASTATIN 20 MG: 20 TABLET, FILM COATED ORAL at 21:22

## 2025-06-04 RX ADMIN — Medication 10 ML: at 00:29

## 2025-06-04 RX ADMIN — Medication 10 ML: at 09:16

## 2025-06-04 RX ADMIN — HEPARIN SODIUM 5000 UNITS: 5000 INJECTION INTRAVENOUS; SUBCUTANEOUS at 13:28

## 2025-06-04 RX ADMIN — SODIUM CHLORIDE, SODIUM LACTATE, POTASSIUM CHLORIDE, CALCIUM CHLORIDE 125 ML/HR: 20; 30; 600; 310 INJECTION, SOLUTION INTRAVENOUS at 09:19

## 2025-06-04 RX ADMIN — SODIUM CHLORIDE 1000 MG: 900 INJECTION INTRAVENOUS at 21:21

## 2025-06-04 RX ADMIN — SENNOSIDES 1 TABLET: 8.6 TABLET, FILM COATED ORAL at 21:22

## 2025-06-04 RX ADMIN — CARBIDOPA AND LEVODOPA 1 TABLET: 25; 100 TABLET ORAL at 09:13

## 2025-06-04 RX ADMIN — METOPROLOL SUCCINATE 150 MG: 50 TABLET, EXTENDED RELEASE ORAL at 09:16

## 2025-06-04 RX ADMIN — Medication 10 ML: at 21:22

## 2025-06-04 RX ADMIN — POLYETHYLENE GLYCOL 3350 17 G: 17 POWDER, FOR SOLUTION ORAL at 10:58

## 2025-06-04 RX ADMIN — HEPARIN SODIUM 5000 UNITS: 5000 INJECTION INTRAVENOUS; SUBCUTANEOUS at 05:08

## 2025-06-04 RX ADMIN — ASPIRIN 325 MG: 325 TABLET ORAL at 09:13

## 2025-06-04 RX ADMIN — ROSUVASTATIN 20 MG: 20 TABLET, FILM COATED ORAL at 00:29

## 2025-06-04 NOTE — ED NOTES
" Lane Dong    Nursing Report ED to Floor:  Mental status: A & O x 4   Ambulatory status: Uses walker at home (unable to ambulate while in the ER) Hx of parkinsons  Oxygen Therapy:  RA  Cardiac Rhythm: NSR  Admitted from: ED/ Home   Safety Concerns:  Fall Risk   Precautions: None  Social Issues: None, Family at bedside   ED Room #:  30    ED Nurse Phone Extension - [phone] or may call [phone].      HPI:   Chief Complaint   Patient presents with    Hypertension    Unable to speak       Past Medical History:  No past medical history on file.     Past Surgical History:  No past surgical history on file.     Admitting Doctor:   Rafael Rodriguez MD    Consulting Provider(s):  Consults       Date and Time Order Name Status Description    6/3/2025  6:14 PM Inpatient Neurology Consult Stroke Completed              Admitting Diagnosis:   The primary encounter diagnosis was Aphasia. A diagnosis of Uncontrolled hypertension was also pertinent to this visit.    Most Recent Vitals:   Vitals:    06/03/25 1841 06/03/25 1900 06/03/25 1930 06/03/25 2000   BP: (!) 195/110 (!) 208/102 (!) 199/97 (!) 184/103   BP Location: Left arm      Patient Position: Sitting      Pulse: 86 85 87 90   Resp: 18      Temp: 98.5 °F (36.9 °C)      TempSrc: Axillary      SpO2: 95% 97% 96% 97%   Weight: 79.4 kg (175 lb)      Height: 182.9 cm (72\")          Active LDAs/IV Access:   Lines, Drains & Airways       Active LDAs       Name Placement date Placement time Site Days    Peripheral IV 06/03/25 1822 18 G Anterior;Distal;Left Forearm 06/03/25  1822  Forearm  less than 1    Peripheral IV 06/03/25 1823 18 G Anterior;Proximal;Right Forearm 06/03/25  1823  Forearm  less than 1                    Labs (abnormal labs have a star):   Labs Reviewed   CBC WITH AUTO DIFFERENTIAL - Abnormal; Notable for the following components:       Result Value    .5 (*)     Lymphocyte % 14.7 (*)     Monocyte % 12.6 (*)     All other components within normal limits "   URINALYSIS W/ MICROSCOPIC IF INDICATED (NO CULTURE) - Abnormal; Notable for the following components:    Appearance, UA Cloudy (*)     Blood, UA Trace (*)     Protein, UA 30 mg/dL (1+) (*)     Leuk Esterase, UA Large (3+) (*)     All other components within normal limits   POCT CHEM 8 - Abnormal; Notable for the following components:    BUN 31 (*)     Creatinine 2.00 (*)     eGFR 32.9 (*)     All other components within normal limits   PROTIME-INR - Normal   APTT - Normal    Narrative:     PTT = The equivalent PTT values for the therapeutic range of heparin levels at 0.3 to 0.5 U/ml are 60 to 70 seconds.   AST - Normal   ALT - Normal   RAINBOW DRAW    Narrative:     The following orders were created for panel order [facility] Draw.  Procedure                               Abnormality         Status                     ---------                               -----------         ------                     Green Top (Gel)[[id number]]                                  Final result               Lavender Top[[id number]]                                     Final result               Gold Top - SST[[id number]]                                   Final result               Gray Top[[id number]]                                         Final result               Light Blue Top[[id number]]                                   Final result                 Please view results for these tests on the individual orders.   URINALYSIS, MICROSCOPIC ONLY   CBC AND DIFFERENTIAL    Narrative:     The following orders were created for panel order CBC & Differential.  Procedure                               Abnormality         Status                     ---------                               -----------         ------                     CBC Auto Differential[[id number]]        Abnormal            Final result                 Please view results for these tests on the individual orders.   GREEN TOP   LAVENDER TOP   GOLD TOP - SST   GRAY TOP   LIGHT  BLUE TOP       Meds Given in ED:   Medications   sodium chloride 0.9 % flush 10 mL (has no administration in time range)   aspirin tablet 325 mg (325 mg Oral Given 6/3/25 1956)     Or   aspirin suppository 300 mg ( Rectal Not Given:  See Alt 6/3/25 1956)   Pharmacy to Dose - Crestor (has no administration in time range)   rosuvastatin (CRESTOR) tablet 20 mg (has no administration in time range)   iopamidol (ISOVUE-370) 76 % injection 115 mL (115 mL Intravenous Given 6/3/25 1841)     Pharmacy Consult,          Last NIH score:  Interval: baseline  1a. Level of Consciousness: 0-->Alert, keenly responsive  1b. LOC Questions: 0-->Answers both questions correctly  1c. LOC Commands: 0-->Performs both tasks correctly  2. Best Gaze: 0-->Normal  3. Visual: 0-->No visual loss  4. Facial Palsy: 0-->Normal symmetrical movements  5a. Motor Arm, Left: 0-->No drift, limb holds 90 (or 45) degrees for full 10 secs  5b. Motor Arm, Right: 0-->No drift, limb holds 90 (or 45) degrees for full 10 secs  6a. Motor Leg, Left: 0-->No drift, leg holds 30 degree position for full 5 secs  6b. Motor Leg, Right: 0-->No drift, leg holds 30 degree position for full 5 secs  7. Limb Ataxia: 0-->Absent  8. Sensory: 1-->Mild-to-moderate sensory loss, patient feels pinprick is less sharp or is dull on the affected side, or there is a loss of superficial pain with pinprick, but patient is aware of being touched  9. Best Language: 0-->No aphasia, normal  10. Dysarthria: 0-->Normal  11. Extinction and Inattention (formerly Neglect): 0-->No abnormality    Total (NIH Stroke Scale): 1     Dysphagia screening results:  Patient Factors Component (Dysphagia:Stroke or Rule-out)  Best Eye Response: 4-->(E4) spontaneous (06/03/25 1955)  Best Motor Response: 6-->(M6) obeys commands (06/03/25 1955)  Best Verbal Response: 5-->(V5) oriented (06/03/25 1955)  Denice Coma Scale Score: 15 (06/03/25 1955)  Is there Facial Asymmetry/Weakness?: No (06/03/25 1955)  Is there  Tongue Asymmetry/Weakness?: No (06/03/25 1955)  Is there Palatal Asymmetry/Weakness?: No (06/03/25 1955)  Patient Assessment Result: Pass - Proceed to Water Test (06/03/25 1955)     Denice Coma Scale:  No data recorded     CIWA:        Restraint Type:            Isolation Status:  No active isolations

## 2025-06-04 NOTE — PLAN OF CARE
Goal Outcome Evaluation:  Plan of Care Reviewed With: patient           Outcome Evaluation: Pt presents with strength, balance, and endurance below baseline contributing to bed mobility, transfer, and ambulation deficits. Pt will benefit from PT to address these deficits and return to PLOF. PT rec return home with 24/7 care and  PT/OT upon dc.    Anticipated Discharge Disposition (PT): home with home health, home with 24/7 care

## 2025-06-04 NOTE — THERAPY EVALUATION
Acute Care - Speech Language Pathology Initial Evaluation  [facility]  Cognitive-Communication Evaluation       Patient Name: Lane Dong  : 1945  MRN: [id number]  Today's Date: 2025               Admit Date: 6/3/2025     Visit Dx:    ICD-10-CM ICD-9-CM   1. Aphasia  R47.01 784.3   2. Uncontrolled hypertension  I10 401.9     Patient Active Problem List   Diagnosis    Dysarthria    Parkinson disease    Essential hypertension    Hyperlipidemia     History reviewed. No pertinent past medical history.  History reviewed. No pertinent surgical history.    SLP Recommendation and Plan  SLP Diagnosis: functional speech/language skills, functional cognitive-linguistic skills (25 110)  SLP Diagnosis Comments: Pt presents with grossly functional speech and language skills at this time. Pt and pt's spouse endorse that the pt's symptoms have resolved and feel he is functioning at baseline. (25 110)           SLC Criteria for Skilled Therapy Interventions Met: no problems identified which require skilled intervention (25 110)  Anticipated Discharge Disposition (SLP): No further SLP services warranted (25 110)        Therapy Frequency (SLP SLC): evaluation only (25 110)                                Progress:  (eval; see note for more information) (25 1201)      SLP EVALUATION (Last 72 Hours)       SLP SLC Evaluation       Row Name 25                   Communication Assessment/Intervention    Document Type discharge evaluation/summary  -MM        Subjective Information no complaints  -MM        Patient/Family/Caregiver Comments/Observations spouse present  -MM        Patient Effort good  -MM           General Information    Patient Profile Reviewed yes  -MM        Pertinent History Of Current Problem Pt is an 80 year old male who presented to the facility with c/o L facial droop and aphasia which the pt reports is resolved. Pt has history of Parkinson's.  -MM         Precautions/Limitations, Vision WFL;for purposes of eval  -MM        Precautions/Limitations, Hearing WFL;for purposes of eval  -MM        Prior Level of Function-Communication WFL  -MM        Plans/Goals Discussed with patient;agreed upon  -MM        Barriers to Rehab none identified  -MM        Patient's Goals for Discharge patient did not state  -MM        Family Goals for Discharge family did not state  -MM           Pain    Pretreatment Pain Rating 0/10 - no pain  -MM        Posttreatment Pain Rating 0/10 - no pain  -MM           Comprehension Assessment/Intervention    Comprehension Assessment/Intervention Auditory Comprehension;Reading Comprehension  -MM           Auditory Comprehension Assessment/Intervention    Auditory Comprehension (Communication) WFL  -MM        Narrative Discourse WFL  -MM           Reading Comprehension Assessment/Intervention    Reading Comprehension (Communication) WFL  -MM        Paragraph Level WFL  -MM           Expression Assessment/Intervention    Expression Assessment/Intervention verbal expression;graphic expression  -MM           Verbal Expression Assessment/Intervention    Verbal Expression WFL  -MM        Conversational Discourse/Fluency WFL  -MM           Oral Musculature and Cranial Nerve Assessment    Oral Motor General Assessment WFL  -MM           Motor Speech Assessment/Intervention    Motor Speech Function WFL  -MM        Conversational Speech (Communication) WFL  -MM        Speech intelligibility 100%;in quiet environment;in connected speech;with unfamiliar listener  -MM           Cursory Voice Assessment/Intervention    Quality and Resonance (Voice) WFL  -MM           SLP Evaluation Clinical Impressions    SLP Diagnosis functional speech/language skills;functional cognitive-linguistic skills  -MM        SLP Diagnosis Comments Pt presents with grossly functional speech and language skills at this time. Pt and pt's spouse endorse that the pt's symptoms have  resolved and feel he is functioning at baseline.  -MM        SLC Criteria for Skilled Therapy Interventions Met no problems identified which require skilled intervention  -MM           Recommendations    Therapy Frequency (SLP SLC) evaluation only  -MM        Anticipated Discharge Disposition (SLP) No further SLP services warranted  -MM                  User Key  (r) = Recorded By, (t) = Taken By, (c) = Cosigned By      Initials Name Effective Dates    Mechelle Parker MS CCC-SLP 08/30/24 -                        EDUCATION  The patient has been educated in the following areas:     Evaluation results and recommendations.                        Time Calculation:      Time Calculation- SLP       Row Name 06/04/25 1201             Time Calculation- SLP    SLP Start Time 1105  -MM      SLP Received On 06/04/25  -MM         Untimed Charges    82097-IS Eval Speech and Production w/ Language Minutes 38  -MM         Total Minutes    Untimed Charges Total Minutes 38  -MM       Total Minutes 38  -MM                User Key  (r) = Recorded By, (t) = Taken By, (c) = Cosigned By      Initials Name Provider Type    Mechelle Parker MS CCC-SLP Speech and Language Pathologist                    Therapy Charges for Today       Code Description Service Date Service Provider Modifiers Qty    [id number] HC ST EVAL SPEECH AND PROD W LANG  3 6/4/2025 Mechelle Sofia MS CCC-SLP GN 1                       Mechelle Sofia MS CCC-SLP  6/4/2025

## 2025-06-04 NOTE — PLAN OF CARE
Goal Outcome Evaluation:  Plan of Care Reviewed With: patient, spouse        Progress:  (eval; see note for more information)       Anticipated Discharge Disposition (SLP): No further SLP services warranted    SLP Diagnosis: functional speech/language skills, functional cognitive-linguistic skills (06/04/25 1105)  SLP Diagnosis Comments: Pt presents with grossly functional speech and language skills at this time. Pt and pt's spouse endorse that the pt's symptoms have resolved and feel he is functioning at baseline. (06/04/25 1105)

## 2025-06-04 NOTE — NURSING NOTE
No changes noted through night; bed in low locked position with call light in reach; caregiver at bedside

## 2025-06-04 NOTE — NURSING NOTE
Heel boots removed due to pt request as they were causing pain; pt requests to not have MRI tonight and let him sleep since he is comfortable at this time; discussed need with caregiver and pt; pt wants to sleep at this time

## 2025-06-04 NOTE — H&P
"    [facility]  HISTORY AND PHYSICAL    Patient Name: Lane Dong  : 1945  MRN: [id number]  Primary Care Physician: Elbert King MD  Date of admission: 6/3/2025      Subjective   Subjective     Chief Complaint:  Dysarthria, facial droop    HPI:  Lane Dong is a 80 y.o. male with hx parkinsons, HTN, HLD who presents with neurological change.  Patient was in his normal state of health until this afternoon.  His wife at bedside reports that he mowed the lawn this morning, was very hot when he came in but did not want to drink much.  He laid down for a nap and when he woke up she noticed that his speech was incoherent and he was staring off into the distance.  EMS was called and they had reported a right-sided facial droop initially.  His blood pressure was noted to be elevated in the 200s so he was given IV Lopressor in transit, notably his deficits seem to resolve at that point.  Upon my assessment wife reports that he does appear to be more confused, he does give one-word answers to questions but cannot reliably relate current history.  She reports no history of atrial fibrillation or prior stroke, he is taking metoprolol for \"irregular heartbeat \".  Most recent medication change is addition of amantadine at end of April, but he appeared to be more confused on this med so she had stopped giving it to him 2 weeks ago.      Personal History     No past medical history on file.        No past surgical history on file.    Family History: family history is not on file.     Social History:    Social History     Social History Narrative    Not on file       Medications:  Available home medication information reviewed.       No Known Allergies    Objective   Objective     Vital Signs:   Temp:  [98.5 °F (36.9 °C)] 98.5 °F (36.9 °C)  Heart Rate:  [84-94] 86  Resp:  [18] 18  BP: (179-208)/() 194/104  Total (NIH Stroke Scale): 1    Physical Exam   Awake, " requires prompting to be alerted to examiner, gives one word answer to questions. Oriented to self only. Otherwise speech is clear  Slight right sided facial droop  EOMI PERRL tongue at midline  Neck supple  MMM  Heart RRR, occ PACs on tele  Lungs CTAB  Abd soft, nontender  No peripheral edema  No focal weakness. Has both resting and extension tremor more predominant BL UE    Result Review:  I have personally reviewed the results from the time of this admission to 6/3/2025 22:00 EDT and agree with these findings:  [x]  Laboratory list / accordion  [x]  Microbiology  [x]  Radiology  [x]  EKG/Telemetry   []  Cardiology/Vascular   []  Pathology  [x]  Old records  []  Other:  Most notable findings include: See A+P      LAB RESULTS:      Lab 06/03/25 1824 06/03/25 1823   WBC 6.45  --    HEMOGLOBIN 13.3  --    HEMOGLOBIN, POC  --  13.9   HEMATOCRIT 41.7  --    HEMATOCRIT POC  --  41   PLATELETS 152  --    NEUTROS ABS 4.54  --    IMMATURE GRANS (ABS) 0.02  --    LYMPHS ABS 0.95  --    MONOS ABS 0.81  --    EOS ABS 0.09  --    .5*  --    PROTIME 14.6  --    INR 1.08  --    APTT 36.0  --          Lab 06/03/25 1824 06/03/25 1823   SODIUM 142  --    POTASSIUM 4.5  --    CHLORIDE 102  --    CO2 24.0  --    ANION GAP 16.0*  --    BUN 30.7*  --    CREATININE 1.86* 2.00*   EGFR 35.9* 32.9*   GLUCOSE 126*  --    CALCIUM 9.6  --    TSH 31.600*  --          Lab 06/03/25 1824   ALT (SGPT) <5   AST (SGOT) 14                     UA          6/3/2025    19:46   Urinalysis   Squamous Epithelial Cells, UA None Seen    Specific [place], UA 1.014    Ketones, UA Negative    Blood, UA Trace    Leukocytes, UA Large (3+)    Nitrite, UA Negative    RBC, UA 3-5    WBC, UA Too Numerous to Count    Bacteria, UA 3+        Microbiology Results (last 10 days)       ** No results found for the last 240 hours. **            XR Chest 1 View  Result Date: 6/3/2025  XR CHEST 1 VW Date of Exam: 6/3/2025 6:45 PM EDT Indication: Acute Stroke  Protocol (onset < 12 hrs) Comparison: None available. Findings: Patient is rotated to the left. Heart shadow appears in the upper range of normal size. Pulmonary vasculature appears upper normal as well. There is mild generalized coarsening of the pulmonary interstitial markings which may be chronic. There appears to  be a small area of discoid atelectasis in the left lung base. No other focal lung disease is seen. No edema, effusion or pneumothorax is seen.     Impression: Impression: 1. Borderline heart shadow enlargement and mild pulmonary venous hypertension. 2. Mild left basilar discoid atelectasis and mild nonspecific pulmonary interstitial changes elsewhere which may be chronic. Electronically Signed: Aaron Vergara MD  6/3/2025 7:43 PM EDT  Workstation ID: [id number]    CT Angiogram Head w AI Analysis of LVO  Result Date: 6/3/2025  CT CEREBRAL PERFUSION W WO CONTRAST, CT ANGIOGRAM NECK, CT ANGIOGRAM HEAD W AI ANALYSIS OF LVO Date of Exam: 6/3/2025 6:27 PM EDT Indication: Neuro Deficit, acute, Stroke suspected Neuro deficit, acute stroke suspected.  Comparison: Concurrent noncontrast head CT Technique: Axial CT images of the brain were obtained prior to and after the administration of 115 mL Isovue-370. Core blood volume, core blood flow, mean transit time, and Tmax images were obtained utilizing the Rapid software protocol. A limited CT angiogram of the head was also performed to measure the blood vessel density. CTA of the head and neck was performed after the uneventful intravenous administration of above contrast. Reconstructed coronal and sagittal images were also obtained. In addition, a 3-D volume rendered image was created for interpretation. Automated exposure control and iterative reconstruction methods were used. The radiation dose reduction device was turned on for each scan per the ALARA (As Low as Reasonably Achievable) protocol. Findings: CT cerebral perfusion: No core infarct or ischemic tissue at  risk. Grossly symmetric cerebral perfusion. Mild patchy hypoperfusion in the watershed regions. CTA HEAD: No abrupt cut off/large vessel occlusion, flow-limiting stenosis, dissection, or aneurysm. The cerebral veins and dural venous sinuses are grossly patent. There is atherosclerosis of the bilateral carotid siphons without flow-limiting stenosis. There is atherosclerosis of the right greater than left V4 segments without flow-limiting stenosis. CTA NECK: No abrupt cut off/large vessel occlusion, flow-limiting stenosis, dissection, or aneurysm. There is atherosclerosis at the bilateral carotid bifurcations and proximal cervical ICAs which results in some luminal irregularity without evidence of flow-limiting stenosis (<50% narrowing per NASCET criteria). There is some tortuosity of the bilateral cervical ICAs which can be seen with longstanding hypertension. There is some atherosclerosis at the aortic arch and left subclavian artery origin. Extravascular findings: Lung apices are grossly clear. The patient is edentulous. 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 No acute or suspicious bony findings. There is bony fusion of the vertebral bodies and posterior elements of C2 and C3.     Impression: Impression: No core infarct or ischemic tissue at risk. Grossly symmetric cerebral perfusion. No abrupt cut off/large vessel occlusion, flow-limiting stenosis, dissection, or aneurysm. There is atherosclerosis of the bilateral carotid bifurcations and proximal cervical ICAs without evidence of flow-limiting stenosis (<50% narrowing per NASCET criteria). Electronically Signed: Kristopher Zepeda MD  6/3/2025 7:18 PM EDT  Workstation ID: [id number]    CT Angiogram Neck  Result Date: 6/3/2025  CT CEREBRAL PERFUSION W WO CONTRAST, CT ANGIOGRAM NECK, CT ANGIOGRAM HEAD W AI ANALYSIS OF LVO Date of Exam: 6/3/2025 6:27 PM EDT Indication: Neuro Deficit, acute, Stroke suspected Neuro deficit, acute stroke suspected.   Comparison: Concurrent noncontrast head CT Technique: Axial CT images of the brain were obtained prior to and after the administration of 115 mL Isovue-370. Core blood volume, core blood flow, mean transit time, and Tmax images were obtained utilizing the Rapid software protocol. A limited CT angiogram of the head was also performed to measure the blood vessel density. CTA of the head and neck was performed after the uneventful intravenous administration of above contrast. Reconstructed coronal and sagittal images were also obtained. In addition, a 3-D volume rendered image was created for interpretation. Automated exposure control and iterative reconstruction methods were used. The radiation dose reduction device was turned on for each scan per the ALARA (As Low as Reasonably Achievable) protocol. Findings: CT cerebral perfusion: No core infarct or ischemic tissue at risk. Grossly symmetric cerebral perfusion. Mild patchy hypoperfusion in the watershed regions. CTA HEAD: No abrupt cut off/large vessel occlusion, flow-limiting stenosis, dissection, or aneurysm. The cerebral veins and dural venous sinuses are grossly patent. There is atherosclerosis of the bilateral carotid siphons without flow-limiting stenosis. There is atherosclerosis of the right greater than left V4 segments without flow-limiting stenosis. CTA NECK: No abrupt cut off/large vessel occlusion, flow-limiting stenosis, dissection, or aneurysm. There is atherosclerosis at the bilateral carotid bifurcations and proximal cervical ICAs which results in some luminal irregularity without evidence of flow-limiting stenosis (<50% narrowing per NASCET criteria). There is some tortuosity of the bilateral cervical ICAs which can be seen with longstanding hypertension. There is some atherosclerosis at the aortic arch and left subclavian artery origin. Extravascular findings: Lung apices are grossly clear. The patient is edentulous. Congenital nonunion of the  anterior and posterior C1 arch. No acute or suspicious bony findings. There is bony fusion of the vertebral bodies and posterior elements of C2 and C3.     Impression: Impression: No core infarct or ischemic tissue at risk. Grossly symmetric cerebral perfusion. No abrupt cut off/large vessel occlusion, flow-limiting stenosis, dissection, or aneurysm. There is atherosclerosis of the bilateral carotid bifurcations and proximal cervical ICAs without evidence of flow-limiting stenosis (<50% narrowing per NASCET criteria). Electronically Signed: Kristopher Zepeda MD  6/3/2025 7:18 PM EDT  Workstation ID: [id number]    CT CEREBRAL PERFUSION WITH & WITHOUT CONTRAST  Result Date: 6/3/2025  CT CEREBRAL PERFUSION W WO CONTRAST, CT ANGIOGRAM NECK, CT ANGIOGRAM HEAD W AI ANALYSIS OF LVO Date of Exam: 6/3/2025 6:27 PM EDT Indication: Neuro Deficit, acute, Stroke suspected Neuro deficit, acute stroke suspected.  Comparison: Concurrent noncontrast head CT Technique: Axial CT images of the brain were obtained prior to and after the administration of 115 mL Isovue-370. Core blood volume, core blood flow, mean transit time, and Tmax images were obtained utilizing the Rapid software protocol. A limited CT angiogram of the head was also performed to measure the blood vessel density. CTA of the head and neck was performed after the uneventful intravenous administration of above contrast. Reconstructed coronal and sagittal images were also obtained. In addition, a 3-D volume rendered image was created for interpretation. Automated exposure control and iterative reconstruction methods were used. The radiation dose reduction device was turned on for each scan per the ALARA (As Low as Reasonably Achievable) protocol. Findings: CT cerebral perfusion: No core infarct or ischemic tissue at risk. Grossly symmetric cerebral perfusion. Mild patchy hypoperfusion in the watershed regions. CTA HEAD: No abrupt cut off/large vessel occlusion,  flow-limiting stenosis, dissection, or aneurysm. The cerebral veins and dural venous sinuses are grossly patent. There is atherosclerosis of the bilateral carotid siphons without flow-limiting stenosis. There is atherosclerosis of the right greater than left V4 segments without flow-limiting stenosis. CTA NECK: No abrupt cut off/large vessel occlusion, flow-limiting stenosis, dissection, or aneurysm. There is atherosclerosis at the bilateral carotid bifurcations and proximal cervical ICAs which results in some luminal irregularity without evidence of flow-limiting stenosis (<50% narrowing per NASCET criteria). There is some tortuosity of the bilateral cervical ICAs which can be seen with longstanding hypertension. There is some atherosclerosis at the aortic arch and left subclavian artery origin. Extravascular findings: Lung apices are grossly clear. The patient is edentulous. 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 No acute or suspicious bony findings. There is bony fusion of the vertebral bodies and posterior elements of C2 and C3.     Impression: Impression: No core infarct or ischemic tissue at risk. Grossly symmetric cerebral perfusion. No abrupt cut off/large vessel occlusion, flow-limiting stenosis, dissection, or aneurysm. There is atherosclerosis of the bilateral carotid bifurcations and proximal cervical ICAs without evidence of flow-limiting stenosis (<50% narrowing per NASCET criteria). Electronically Signed: Kristopher Zepeda MD  6/3/2025 7:18 PM EDT  Workstation ID: [id number]    CT Head Without Contrast Stroke Protocol  Result Date: 6/3/2025  CT HEAD WO CONTRAST STROKE PROTOCOL Date of Exam: 6/3/2025 6:14 PM EDT Indication: Neuro deficit, acute, stroke suspected Neuro Deficit, acute, Stroke suspected. Comparison: No previous exams currently available. Technique: Axial CT images were obtained of the head without contrast administration.  Reconstructed coronal images were also obtained.  Automated exposure control and iterative construction methods were used. Scan Time: 1814 Results discussed with JEN Jang at 1819, 6/3/2025. Findings: The calvarium appears intact. Included paranasal sinuses and mastoids appear clear. Symmetric irregularities of the ring of C1, along the right and left lateral margins of the lamina, and also anterior midline of the ring of C1 respectively noted on images 12 and 14. These all appear to represent fusion anomalies with incomplete bony fusion, rather than acute trauma. Orbits appear grossly normal. There is expected degree of generalized cerebral atrophy for age. There is no evidence of hemorrhage, contusion, or edema, mass or mass effect, hydrocephalus, or abnormal extra-axial collection. What initially appears to be a small lacunar type infarct in the medial and superior left thalamus is actually volume averaging artifact with adjacent CSF. There is no evidence of acute infarct and no definite old infarct.     Impression: Impression: 1. Chronic appearing changes of the aging brain. No evidence of acute intracranial disease is seen. 2. 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 The posterior fusion anomalies resemble subacute or old trauma but are smooth margined and perfectly symmetric on the right and left and favored to be developmental variant. Electronically Signed: Aaron Vergara MD  6/3/2025 6:27 PM EDT  Workstation ID: [id number]          Assessment & Plan   Assessment & Plan       Dysarthria    Parkinson disease    Essential hypertension    Hyperlipidemia    Dysarthria, facial droop  Encephalopathy, suspect toxic/metabolic  -Transient episode of word salad and facial droop. Symptoms initially resolved, now has slight facial droop. Confused currently. Suspect hypovolemia, HTN encephalopathy, medication effect, vs TIA. CT imaging shows no bleeding, LVO, or perfusion deficit. <50% carotid stenosis.   -Neuro  stroke team following, appreciate recommendations. MRI, echo, A1c, lipids pending  -checking VBG, TSH, BMP  -hold sedating/altering meds  -c/w MIVF    Parkinson's disease  -has stopped taking amantadine 2 weeks ago so unlikely side effect/discontinuation symptoms. Will continue prior dose of sinemet     SULMA   -no prior labs available however family reports no prior issues with kidneys. POC Cr 2.0. Does not appear hypovolemic currently but clinical history does support dehydration.   -IVF ordered  -re-check BMP, initial labs were point of care  -check FeUrea, PVR    UTI  -unable to give symptoms. Urine has LE and +bacteria, will treat for now. Culture pending    HTN  HLD  Irregular heart beat, PACs  -no known hx afib, not on blood thinner. Suspect metoprolol may have been given for PACs as these are frequent on tele currently. Baseline poor due to tremor but does have definite p waves.   -Statin per neuro. At home takes metoprolol and lisinopril. Will resume metoprolol, hold lisinopril 2/2 SULMA    Hypothyroidism  -t4 suppressed, TSH elevated. This appears to be new diagnosis. Will start on 25 mcg levothyroxine     Insomnia  -holding seroquel due to mental status    Code status  -discussed with patient's wife at bedside, she states he would not want aggressive measures in care but did ask about trial of CPR. Discussed the low likelihood of survival or meaningful recovery if CPR initiated without intubation. She will consider this further, but documented code status per her wishes for now        VTE Prophylaxis:  Mechanical & pharmacologic VTE prophylaxis orders are signed & held.           CODE STATUS:    Code Status and Medical Interventions: CPR (Attempt to Resuscitate); Limited Support; No intubation (DNI)   Ordered at: 06/03/25 2120     Code Status (Patient has no pulse and is not breathing):    CPR (Attempt to Resuscitate)     Medical Interventions (Patient has pulse or is breathing):    Limited Support      Medical Intervention Limits:    No intubation (DNI)     Level Of Support Discussed With:    Health Care Surrogate       Expected Discharge   Expected discharge date/ time has not been documented.     Rafael Rodriguez MD  06/03/25

## 2025-06-04 NOTE — NURSING NOTE
Caregiver does not have med list with her and family took list home with them; instructed family to bring list in when they come back to see patient

## 2025-06-04 NOTE — THERAPY EVALUATION
Patient Name: Lane Dong  : 1945    MRN: [id number]                              Today's Date: 2025       Admit Date: 6/3/2025    Visit Dx:     ICD-10-CM ICD-9-CM   1. Aphasia  R47.01 784.3   2. Uncontrolled hypertension  I10 401.9     Patient Active Problem List   Diagnosis    Dysarthria    Parkinson disease    Essential hypertension    Hyperlipidemia     History reviewed. No pertinent past medical history.  History reviewed. No pertinent surgical history.   General Information       Row Name 25 1005          Physical Therapy Time and Intention    Document Type evaluation  -KR     Mode of Treatment physical therapy;co-treatment  -KR       Row Name 25 1005          General Information    Patient Profile Reviewed yes  -KR     Prior Level of Function --   per CM, /7 caregivers 5days/wk, grandson and spouse assist other 2 days. Rollator use at baseline. Pt able to feed self.  -KR     Existing Precautions/Restrictions fall;other (see comments)  PD  -KR     Barriers to Rehab medically complex;previous functional deficit  -KR       Row Name 25 1005          Living Environment    Current Living Arrangements home  -KR     People in Home spouse  -KR       Row Name 25 1005          Home Main Entrance    Number of Stairs, Main Entrance none  -KR       Row Name 25 1005          Stairs Within Home, Primary    Number of Stairs, Within Home, Primary none  -KR       Row Name 25 1005          Cognition    Orientation Status (Cognition) oriented x 3  -KR       Row Name 25 1005          Safety Issues/Impairments Affecting Functional Mobility    Safety Issues Affecting Function (Mobility) insight into deficits/self-awareness;awareness of need for assistance;problem-solving;safety precaution awareness;sequencing abilities;safety precautions follow-through/compliance  -KR     Impairments Affecting Function (Mobility) balance;endurance/activity tolerance;strength;motor  control;motor planning;postural/trunk control  -KR               User Key  (r) = Recorded By, (t) = Taken By, (c) = Cosigned By      Initials Name Provider Type    Sendy Gonzalez PT Physical Therapist                   Mobility       Row Name 06/04/25 1011          Bed Mobility    Bed Mobility supine-sit  -KR     Supine-Sit [place] (Bed Mobility) moderate assist (50% patient effort);1 person assist  -KR     Assistive Device (Bed Mobility) head of bed elevated;bed rails  -KR     Comment, (Bed Mobility) cues for bedrail use and LE progression  -KR       Row Name 06/04/25 1011          Sit-Stand Transfer    Sit-Stand [place] (Transfers) minimum assist (75% patient effort);2 person assist  -KR     Assistive Device (Sit-Stand Transfers) walker, front-wheeled  -KR     Comment, (Sit-Stand Transfer) first attempt to stand from bed, pt unable to clear hips. Second attempt, pt stood with cues for safe hand placement, anterior weight shift, and rocking for momentum.  -KR       Row Name 06/04/25 1011          Gait/Stairs (Locomotion)    [place] Level (Gait) minimum assist (75% patient effort);1 person assist  -KR     Assistive Device (Gait) walker, front-wheeled  -KR     Distance in Feet (Gait) 12  -KR     Deviations/Abnormal Patterns (Gait) maximiliano decreased;gait speed decreased;stride length decreased;weight shifting decreased;bilateral deviations;base of support, narrow  -KR     Bilateral Gait Deviations heel strike decreased;forward flexed posture  -KR     Comment, (Gait/Stairs) cues for increased step length, upright posture, and walker management. Assist req'd for walker progression. Farther distance limited by fatigue.  -KR               User Key  (r) = Recorded By, (t) = Taken By, (c) = Cosigned By      Initials Name Provider Type    Sendy Gonzalez PT Physical Therapist                   Obj/Interventions       Row Name 06/04/25 1012          Range of Motion Comprehensive    General Range of  Motion lower extremity range of motion deficits identified  -KR     Comment, General Range of Motion B knee extension ROM limited by ~20 degrees  -KR       Row Name 06/04/25 1012          Strength Comprehensive (MMT)    General Manual Muscle Testing (MMT) Assessment lower extremity strength deficits identified  -KR     Comment, General Manual Muscle Testing (MMT) Assessment BLEs grossly 4+/5  -KR       Row Name 06/04/25 1012          Balance    Balance Assessment sitting static balance;sitting dynamic balance;standing static balance;standing dynamic balance  -KR     Static Sitting Balance contact guard  -KR     Dynamic Sitting Balance minimal assist;1-person assist;verbal cues;non-verbal cues (demo/gesture)  posterior lean with dynamic balance activities  -KR     Position, Sitting Balance unsupported;sitting edge of bed;sitting in chair  -KR     Static Standing Balance minimal assist;1-person assist  -KR     Dynamic Standing Balance minimal assist;1-person assist;verbal cues;non-verbal cues (demo/gesture)  -KR     Position/Device Used, Standing Balance supported;walker, front-wheeled  -KR     Balance Interventions sitting;standing;static;sit to stand;supported;dynamic  -KR       Row Name 06/04/25 1012          Sensory Assessment (Somatosensory)    Sensory Assessment (Somatosensory) LE sensation intact  -KR               User Key  (r) = Recorded By, (t) = Taken By, (c) = Cosigned By      Initials Name Provider Type    KR Sendy Guardado, PT Physical Therapist                   Goals/Plan       Row Name 06/04/25 1017          Bed Mobility Goal 1 (PT)    Activity/Assistive Device (Bed Mobility Goal 1, PT) bed mobility activities, all  -KR     [place] Level/Cues Needed (Bed Mobility Goal 1, PT) minimum assist (75% or more patient effort)  -KR     Time Frame (Bed Mobility Goal 1, PT) short term goal (STG);1 week  -KR       Row Name 06/04/25 1017          Transfer Goal 1 (PT)    Activity/Assistive Device (Transfer  Goal 1, PT) sit-to-stand/stand-to-sit;bed-to-chair/chair-to-bed;walker, rolling  -KR     [place] Level/Cues Needed (Transfer Goal 1, PT) contact guard required  -KR     Time Frame (Transfer Goal 1, PT) long term goal (LTG);2 weeks  -KR       Row Name 06/04/25 1017          Gait Training Goal 1 (PT)    Activity/Assistive Device (Gait Training Goal 1, PT) gait (walking locomotion);improve balance and speed;increase endurance/gait distance;assistive device use  -KR     [place] Level (Gait Training Goal 1, PT) contact guard required  -KR     Distance (Gait Training Goal 1, PT) 150  -KR     Time Frame (Gait Training Goal 1, PT) long term goal (LTG);2 weeks  -KR       Row Name 06/04/25 1017          Therapy Assessment/Plan (PT)    Planned Therapy Interventions (PT) balance training;bed mobility training;gait training;home exercise program;neuromuscular re-education;patient/family education;postural re-education;transfer training;stretching;strengthening;stair training;ROM (range of motion);motor coordination training  -KR               User Key  (r) = Recorded By, (t) = Taken By, (c) = Cosigned By      Initials Name Provider Type    KR Sendy Guardado, PT Physical Therapist                   Clinical Impression       Row Name 06/04/25 1013          Pain    Pretreatment Pain Rating 0/10 - no pain  -KR     Posttreatment Pain Rating 0/10 - no pain  -KR       Row Name 06/04/25 1013          Plan of Care Review    Plan of Care Reviewed With patient  -KR     Outcome Evaluation Pt presents with strength, balance, and endurance below baseline contributing to bed mobility, transfer, and ambulation deficits. Pt will benefit from PT to address these deficits and return to OF. PT rec return home with 24/7 care and  PT/OT upon dc.  -KR       Row Name 06/04/25 1013          Therapy Assessment/Plan (PT)    Patient/Family Therapy Goals Statement (PT) to get stronger  -KR     Rehab Potential (PT) fair  -KR     Criteria for  Skilled Interventions Met (PT) skilled treatment is necessary;meets criteria;yes  -KR     Therapy Frequency (PT) daily  -KR     Predicted Duration of Therapy Intervention (PT) 2 weeks  -KR       Row Name 06/04/25 1013          Vital Signs    Pre Systolic BP Rehab 141  -KR     Pre Treatment Diastolic BP 92  -KR     Post Systolic BP Rehab 168  -KR     Post Treatment Diastolic   -KR     Pre Patient Position Supine  -KR     Intra Patient Position Standing  -KR     Post Patient Position Sitting  -KR       Row Name 06/04/25 1013          Positioning and Restraints    Pre-Treatment Position in bed  -KR     Post Treatment Position chair  -KR     In Chair notified nsg;reclined;call light within reach;encouraged to call for assist;exit alarm on;waffle cushion;legs elevated;heels elevated  -KR               User Key  (r) = Recorded By, (t) = Taken By, (c) = Cosigned By      Initials Name Provider Type    Sendy Gonzalez, PT Physical Therapist                   Outcome Measures       Row Name 06/04/25 1019 06/03/25 2300       How much help from another person do you currently need...    Turning from your back to your side while in flat bed without using bedrails? 2  -KR 2  -AM    Moving from lying on back to sitting on the side of a flat bed without bedrails? 2  -KR 2  -AM    Moving to and from a bed to a chair (including a wheelchair)? 3  -KR 2  -AM    Standing up from a chair using your arms (e.g., wheelchair, bedside chair)? 2  -KR 2  -AM    Climbing 3-5 steps with a railing? 2  -KR 2  -AM    To walk in hospital room? 3  -KR 2  -AM    AM-PAC 6 Clicks Score (PT) 14  -KR 12  -AM    Highest Level of Mobility Goal Move to Chair/Commode-4  -KR Move to Chair/Commode-4  -AM      Row Name 06/04/25 1019          Modified Danni Scale    Pre-Stroke Modified [place] Scale 6 - Unable to determine (UTD) from the medical record documentation  -KR     Modified [place] Scale 4 - Moderately severe disability.  Unable to walk without  assistance, and unable to attend to own bodily needs without assistance.  -KR       Row Name 06/04/25 1019          Functional Assessment    Outcome Measure Options AM-PAC 6 Clicks Basic Mobility (PT);Modified [place]  -KR               User Key  (r) = Recorded By, (t) = Taken By, (c) = Cosigned By      Initials Name Provider Type    Sendy Gonzalez, PT Physical Therapist    Alistair Baird, RN Registered Nurse                                 Physical Therapy Education       Title: PT OT SLP Therapies (In Progress)       Topic: Physical Therapy (In Progress)       Point: Mobility training (Done)       Learning Progress Summary            Patient Acceptance, E, VU by KR at 6/4/2025 1019                      Point: Home exercise program (Not Started)       Learner Progress:  Not documented in this visit.              Point: Body mechanics (Done)       Learning Progress Summary            Patient Acceptance, E, VU by SONIA at 6/4/2025 1019                      Point: Precautions (Done)       Learning Progress Summary            Patient Acceptance, E, VU by KR at 6/4/2025 1019                                      User Key       Initials Effective Dates Name Provider Type Discipline     12/30/22 -  Sendy Guardado, PT Physical Therapist PT                  PT Recommendation and Plan  Planned Therapy Interventions (PT): balance training, bed mobility training, gait training, home exercise program, neuromuscular re-education, patient/family education, postural re-education, transfer training, stretching, strengthening, stair training, ROM (range of motion), motor coordination training  Outcome Evaluation: Pt presents with strength, balance, and endurance below baseline contributing to bed mobility, transfer, and ambulation deficits. Pt will benefit from PT to address these deficits and return to OF. PT rec return home with 24/7 care and HH PT/OT upon dc.     Time Calculation:   PT Evaluation Complexity  History, PT  Evaluation Complexity: 3 or more personal factors and/or comorbidities  Examination of Body Systems (PT Eval Complexity): total of 4 or more elements  Clinical Presentation (PT Evaluation Complexity): evolving  Clinical Decision Making (PT Evaluation Complexity): moderate complexity  Overall Complexity (PT Evaluation Complexity): moderate complexity     PT Charges       Row Name 06/04/25 1020             Time Calculation    Start Time 0828  -KR      PT Received On 06/04/25  -KR      PT Goal Re-Cert Due Date 06/14/25  -KR         Timed Charges    30452 - PT Therapeutic Activity Minutes 8  -KR         Untimed Charges    PT Eval/Re-eval Minutes 46  -KR         Total Minutes    Timed Charges Total Minutes 8  -KR      Untimed Charges Total Minutes 46  -KR       Total Minutes 54  -KR                User Key  (r) = Recorded By, (t) = Taken By, (c) = Cosigned By      Initials Name Provider Type    Sendy Gonzalez, PT Physical Therapist                  Therapy Charges for Today       Code Description Service Date Service Provider Modifiers Qty    07577328414 HC PT THERAPEUTIC ACT EA 15 MIN 6/4/2025 Sendy Guardado, PT GP 1    69278079130 HC PT EVAL MOD COMPLEXITY 4 6/4/2025 Sendy Guardado, PT GP 1            PT G-Codes  Outcome Measure Options: AM-PAC 6 Clicks Basic Mobility (PT), Modified [place]  AM-PAC 6 Clicks Score (PT): 14  Modified [place] Scale: 4 - Moderately severe disability.  Unable to walk without assistance, and unable to attend to own bodily needs without assistance.  PT Discharge Summary  Anticipated Discharge Disposition (PT): home with home health, home with 24/7 care    Sendy Guardado PT  6/4/2025

## 2025-06-04 NOTE — PLAN OF CARE
Goal Outcome Evaluation:  Plan of Care Reviewed With: patient        Progress: no change  Outcome Evaluation: Pt presents with BUE tremors alongside strength, balance, and endurance deficits warranting IP OT services to promote return to and maintenance of PLOF. Rec d/c to home with continued 24/7 care and HHOT/PT.    Anticipated Discharge Disposition (OT): home with 24/7 care, home with home health

## 2025-06-04 NOTE — CASE MANAGEMENT/SOCIAL WORK
Discharge Planning Assessment  [facility]     Patient Name: Lane Dong  MRN: [id number]  Today's Date: 6/4/2025    Admit Date: 6/3/2025    Plan: IDP   Discharge Needs Assessment       Row Name 06/04/25 1206       Living Environment    People in Home spouse    Name(s) of People in Home Herlinda    Current Living Arrangements home    Potentially Unsafe Housing Conditions unable to assess    In the past 12 months has the electric, gas, oil, or water company threatened to shut off services in your home? No    Primary Care Provided by self    Provides Primary Care For no one    Family Caregiver if Needed none    Quality of Family Relationships supportive;involved;helpful    Able to Return to Prior Arrangements no       Resource/Environmental Concerns    Transportation Concerns none       Transportation Needs    In the past 12 months, has lack of transportation kept you from medical appointments or from getting medications? no    In the past 12 months, has lack of transportation kept you from meetings, work, or from getting things needed for daily living? No       Food Insecurity    Within the past 12 months, you worried that your food would run out before you got the money to buy more. Never true    Within the past 12 months, the food you bought just didn't last and you didn't have money to get more. Never true       Transition Planning    Patient/Family Anticipates Transition to home with family    Patient/Family Anticipated Services at Transition none    Transportation Anticipated family or friend will provide       Discharge Needs Assessment    Readmission Within the Last 30 Days no previous admission in last 30 days    Equipment Currently Used at Home walker, rolling;shower chair;wheelchair    Concerns to be Addressed no discharge needs identified;denies needs/concerns at this time    Do you want help finding or keeping work or a job? I do not need or want help    Do you want help with school or training? For  example, starting or completing job training or getting a high school diploma, GED or equivalent No    Anticipated Changes Related to Illness none    Equipment Needed After Discharge none                   Discharge Plan       Row Name 06/04/25 1207       Plan    Plan IDP    Patient/Family in Agreement with Plan yes    Plan Comments Spoke with patient and caregiver at bedside for IDP. Pt lives in [facility] in house with spouse and grandson. Has caregivers 5 days week 24/7, family with pt rest of time as he is never alone.  Pt states he is able to feed self and the rest is done for him. Does use walker. Not current with HH/OPPT. Current with PCP on file. Insurance verified but unsure of pharmacy he uses. Per caregiver daighter normally transports. Current therapy recs are for HH will discuss with spouse on next visit. CM will cont to follow                       Demographic Summary       Row Name 06/04/25 1206       General Information    Admission Type observation    Arrived From emergency department    Referral Source admission list    Reason for Consult decision-making    Preferred Language English                   Functional Status       Row Name 06/04/25 1206       Functional Status    Usual Activity Tolerance fair    Current Activity Tolerance fair       Physical Activity    On average, how many days per week do you engage in moderate to strenuous exercise (like a brisk walk)? 0 days    On average, how many minutes do you engage in exercise at this level? 0 min    Number of minutes of exercise per week 0       Functional Status, IADL    Medications independent    Meal Preparation independent    Housekeeping independent    Laundry independent    Shopping independent    If for any reason you need help with day-to-day activities such as bathing, preparing meals, shopping, managing finances, etc., do you get the help you need? I get all the help I need                   Psychosocial    No documentation.                   Abuse/Neglect    No documentation.                  Legal    No documentation.                  Substance Abuse    No documentation.                  Patient Forms    No documentation.                     Kirsten Garcia RN

## 2025-06-04 NOTE — PROGRESS NOTES
[facility] Medicine Services  PROGRESS NOTE    Patient Name: Lane Dong  : 1945  MRN: [id number]    Date of Admission: 6/3/2025  Primary Care Physician: Elbert King MD    Subjective   Subjective     CC:  Dysarthria, facial droop    HPI:  Patient seen and examined this morning.  Caregiver at bedside.  She reports he is back to baseline.  Patient feeling better today.      Objective   Objective     Vital Signs:   Temp:  [98.2 °F (36.8 °C)-100.6 °F (38.1 °C)] 99 °F (37.2 °C)  Heart Rate:  [55-94] 63  Resp:  [16-18] 16  BP: (137-208)/() 145/77     Physical Exam  Constitutional:       General: He is not in acute distress.  Cardiovascular:      Rate and Rhythm: Normal rate and regular rhythm.      Heart sounds: Normal heart sounds.   Pulmonary:      Effort: Pulmonary effort is normal.      Breath sounds: Normal breath sounds.   Abdominal:      General: There is no distension.      Palpations: Abdomen is soft.      Tenderness: There is no abdominal tenderness.   Musculoskeletal:      Right lower leg: No edema.      Left lower leg: No edema.   Neurological:      Mental Status: He is alert.      Comments: Resting tremor, flat affect        Results Reviewed:  LAB RESULTS:      Lab 25  18225   WBC 6.45  --    HEMOGLOBIN 13.3  --    HEMOGLOBIN, POC  --  13.9   HEMATOCRIT 41.7  --    HEMATOCRIT POC  --  41   PLATELETS 152  --    NEUTROS ABS 4.54  --    IMMATURE GRANS (ABS) 0.02  --    LYMPHS ABS 0.95  --    MONOS ABS 0.81  --    EOS ABS 0.09  --    .5*  --    PROCALCITONIN 0.10  --    PROTIME 14.6  --    APTT 36.0  --          Lab 25  1237 25  1236 25  1824 25   SODIUM 139  --  142  --    POTASSIUM 4.3  --  4.5  --    CHLORIDE 102  --  102  --    CO2 26.0  --  24.0  --    ANION GAP 11.0  --  16.0*  --    BUN 29.3*  --  30.7*  --    CREATININE 1.77*  --  1.86* 2.00*   EGFR 38.1*  --  35.9* 32.9*   GLUCOSE 133*  --   126*  --    CALCIUM 8.9  --  9.6  --    MAGNESIUM 2.0  --   --   --    HEMOGLOBIN A1C  --  5.80*  --   --    TSH  --   --  31.600*  --          Lab 06/03/25  1824   ALT (SGPT) <5   AST (SGOT) 14         Lab 06/03/25  1824   PROTIME 14.6   INR 1.08         Lab 06/04/25  1237   CHOLESTEROL 111   LDL CHOL 52   HDL CHOL 42   TRIGLYCERIDES 89             Lab 06/03/25  2359   FIO2 21   CARBOXYHEMOGLOBIN (VENOUS) 1.2     Brief Urine Lab Results  (Last result in the past 365 days)        Color   Clarity   Blood   Leuk Est   Nitrite   Protein   CREAT   Urine HCG        06/03/25 1946             23.5         06/03/25 1946 Yellow   Cloudy   Trace   Large (3+)   Negative   30 mg/dL (1+)                   Microbiology Results Abnormal       None            XR Chest 1 View  Result Date: 6/3/2025  XR CHEST 1 VW Date of Exam: 6/3/2025 6:45 PM EDT Indication: Acute Stroke Protocol (onset < 12 hrs) Comparison: None available. Findings: Patient is rotated to the left. Heart shadow appears in the upper range of normal size. Pulmonary vasculature appears upper normal as well. There is mild generalized coarsening of the pulmonary interstitial markings which may be chronic. There appears to  be a small area of discoid atelectasis in the left lung base. No other focal lung disease is seen. No edema, effusion or pneumothorax is seen.     Impression: Impression: 1. Borderline heart shadow enlargement and mild pulmonary venous hypertension. 2. Mild left basilar discoid atelectasis and mild nonspecific pulmonary interstitial changes elsewhere which may be chronic. Electronically Signed: Aarno Vergara MD  6/3/2025 7:43 PM EDT  Workstation ID: [id number]    CT Angiogram Head w AI Analysis of LVO  Result Date: 6/3/2025  CT CEREBRAL PERFUSION W WO CONTRAST, CT ANGIOGRAM NECK, CT ANGIOGRAM HEAD W AI ANALYSIS OF LVO Date of Exam: 6/3/2025 6:27 PM EDT Indication: Neuro Deficit, acute, Stroke suspected Neuro deficit, acute stroke suspected.  Comparison:  Concurrent noncontrast head CT Technique: Axial CT images of the brain were obtained prior to and after the administration of 115 mL Isovue-370. Core blood volume, core blood flow, mean transit time, and Tmax images were obtained utilizing the Rapid software protocol. A limited CT angiogram of the head was also performed to measure the blood vessel density. CTA of the head and neck was performed after the uneventful intravenous administration of above contrast. Reconstructed coronal and sagittal images were also obtained. In addition, a 3-D volume rendered image was created for interpretation. Automated exposure control and iterative reconstruction methods were used. The radiation dose reduction device was turned on for each scan per the ALARA (As Low as Reasonably Achievable) protocol. Findings: CT cerebral perfusion: No core infarct or ischemic tissue at risk. Grossly symmetric cerebral perfusion. Mild patchy hypoperfusion in the watershed regions. CTA HEAD: No abrupt cut off/large vessel occlusion, flow-limiting stenosis, dissection, or aneurysm. The cerebral veins and dural venous sinuses are grossly patent. There is atherosclerosis of the bilateral carotid siphons without flow-limiting stenosis. There is atherosclerosis of the right greater than left V4 segments without flow-limiting stenosis. CTA NECK: No abrupt cut off/large vessel occlusion, flow-limiting stenosis, dissection, or aneurysm. There is atherosclerosis at the bilateral carotid bifurcations and proximal cervical ICAs which results in some luminal irregularity without evidence of flow-limiting stenosis (<50% narrowing per NASCET criteria). There is some tortuosity of the bilateral cervical ICAs which can be seen with longstanding hypertension. There is some atherosclerosis at the aortic arch and left subclavian artery origin. Extravascular findings: Lung apices are grossly clear. The patient is edentulous. 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 No acute or suspicious bony findings. There is bony fusion of the vertebral bodies and posterior elements of C2 and C3.     Impression: Impression: No core infarct or ischemic tissue at risk. Grossly symmetric cerebral perfusion. No abrupt cut off/large vessel occlusion, flow-limiting stenosis, dissection, or aneurysm. There is atherosclerosis of the bilateral carotid bifurcations and proximal cervical ICAs without evidence of flow-limiting stenosis (<50% narrowing per NASCET criteria). Electronically Signed: Kristopher Zepeda MD  6/3/2025 7:18 PM EDT  Workstation ID: [id number]    CT Angiogram Neck  Result Date: 6/3/2025  CT CEREBRAL PERFUSION W WO CONTRAST, CT ANGIOGRAM NECK, CT ANGIOGRAM HEAD W AI ANALYSIS OF LVO Date of Exam: 6/3/2025 6:27 PM EDT Indication: Neuro Deficit, acute, Stroke suspected Neuro deficit, acute stroke suspected.  Comparison: Concurrent noncontrast head CT Technique: Axial CT images of the brain were obtained prior to and after the administration of 115 mL Isovue-370. Core blood volume, core blood flow, mean transit time, and Tmax images were obtained utilizing the Rapid software protocol. A limited CT angiogram of the head was also performed to measure the blood vessel density. CTA of the head and neck was performed after the uneventful intravenous administration of above contrast. Reconstructed coronal and sagittal images were also obtained. In addition, a 3-D volume rendered image was created for interpretation. Automated exposure control and iterative reconstruction methods were used. The radiation dose reduction device was turned on for each scan per the ALARA (As Low as Reasonably Achievable) protocol. Findings: CT cerebral perfusion: No core infarct or ischemic tissue at risk. Grossly symmetric cerebral perfusion. Mild patchy hypoperfusion in the watershed regions. CTA HEAD: No abrupt cut off/large vessel occlusion, flow-limiting stenosis, dissection, or aneurysm. The  cerebral veins and dural venous sinuses are grossly patent. There is atherosclerosis of the bilateral carotid siphons without flow-limiting stenosis. There is atherosclerosis of the right greater than left V4 segments without flow-limiting stenosis. CTA NECK: No abrupt cut off/large vessel occlusion, flow-limiting stenosis, dissection, or aneurysm. There is atherosclerosis at the bilateral carotid bifurcations and proximal cervical ICAs which results in some luminal irregularity without evidence of flow-limiting stenosis (<50% narrowing per NASCET criteria). There is some tortuosity of the bilateral cervical ICAs which can be seen with longstanding hypertension. There is some atherosclerosis at the aortic arch and left subclavian artery origin. Extravascular findings: Lung apices are grossly clear. The patient is edentulous. 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 No acute or suspicious bony findings. There is bony fusion of the vertebral bodies and posterior elements of C2 and C3.     Impression: Impression: No core infarct or ischemic tissue at risk. Grossly symmetric cerebral perfusion. No abrupt cut off/large vessel occlusion, flow-limiting stenosis, dissection, or aneurysm. There is atherosclerosis of the bilateral carotid bifurcations and proximal cervical ICAs without evidence of flow-limiting stenosis (<50% narrowing per NASCET criteria). Electronically Signed: Kristopher Zepeda MD  6/3/2025 7:18 PM EDT  Workstation ID: [id number]    CT CEREBRAL PERFUSION WITH & WITHOUT CONTRAST  Result Date: 6/3/2025  CT CEREBRAL PERFUSION W WO CONTRAST, CT ANGIOGRAM NECK, CT ANGIOGRAM HEAD W AI ANALYSIS OF LVO Date of Exam: 6/3/2025 6:27 PM EDT Indication: Neuro Deficit, acute, Stroke suspected Neuro deficit, acute stroke suspected.  Comparison: Concurrent noncontrast head CT Technique: Axial CT images of the brain were obtained prior to and after the administration of 115 mL Isovue-370. Core blood volume,  core blood flow, mean transit time, and Tmax images were obtained utilizing the Rapid software protocol. A limited CT angiogram of the head was also performed to measure the blood vessel density. CTA of the head and neck was performed after the uneventful intravenous administration of above contrast. Reconstructed coronal and sagittal images were also obtained. In addition, a 3-D volume rendered image was created for interpretation. Automated exposure control and iterative reconstruction methods were used. The radiation dose reduction device was turned on for each scan per the ALARA (As Low as Reasonably Achievable) protocol. Findings: CT cerebral perfusion: No core infarct or ischemic tissue at risk. Grossly symmetric cerebral perfusion. Mild patchy hypoperfusion in the watershed regions. CTA HEAD: No abrupt cut off/large vessel occlusion, flow-limiting stenosis, dissection, or aneurysm. The cerebral veins and dural venous sinuses are grossly patent. There is atherosclerosis of the bilateral carotid siphons without flow-limiting stenosis. There is atherosclerosis of the right greater than left V4 segments without flow-limiting stenosis. CTA NECK: No abrupt cut off/large vessel occlusion, flow-limiting stenosis, dissection, or aneurysm. There is atherosclerosis at the bilateral carotid bifurcations and proximal cervical ICAs which results in some luminal irregularity without evidence of flow-limiting stenosis (<50% narrowing per NASCET criteria). There is some tortuosity of the bilateral cervical ICAs which can be seen with longstanding hypertension. There is some atherosclerosis at the aortic arch and left subclavian artery origin. Extravascular findings: Lung apices are grossly clear. The patient is edentulous. 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 No acute or suspicious bony findings. There is bony fusion of the vertebral bodies and posterior elements of C2 and C3.     Impression: Impression:  No core infarct or ischemic tissue at risk. Grossly symmetric cerebral perfusion. No abrupt cut off/large vessel occlusion, flow-limiting stenosis, dissection, or aneurysm. There is atherosclerosis of the bilateral carotid bifurcations and proximal cervical ICAs without evidence of flow-limiting stenosis (<50% narrowing per NASCET criteria). Electronically Signed: Kristopher Zepeda MD  6/3/2025 7:18 PM EDT  Workstation ID: [id number]    CT Head Without Contrast Stroke Protocol  Result Date: 6/3/2025  CT HEAD WO CONTRAST STROKE PROTOCOL Date of Exam: 6/3/2025 6:14 PM EDT Indication: Neuro deficit, acute, stroke suspected Neuro Deficit, acute, Stroke suspected. Comparison: No previous exams currently available. Technique: Axial CT images were obtained of the head without contrast administration.  Reconstructed coronal images were also obtained. Automated exposure control and iterative construction methods were used. Scan Time: 1814 Results discussed with JEN Jang at 1819, 6/3/2025. Findings: The calvarium appears intact. Included paranasal sinuses and mastoids appear clear. Symmetric irregularities of the ring of C1, along the right and left lateral margins of the lamina, and also anterior midline of the ring of C1 respectively noted on images 12 and 14. These all appear to represent fusion anomalies with incomplete bony fusion, rather than acute trauma. Orbits appear grossly normal. There is expected degree of generalized cerebral atrophy for age. There is no evidence of hemorrhage, contusion, or edema, mass or mass effect, hydrocephalus, or abnormal extra-axial collection. What initially appears to be a small lacunar type infarct in the medial and superior left thalamus is actually volume averaging artifact with adjacent CSF. There is no evidence of acute infarct and no definite old infarct.     Impression: Impression: 1. Chronic appearing changes of the aging brain. No evidence of acute intracranial  disease is seen. 2. 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 The posterior fusion anomalies resemble subacute or old trauma but are smooth margined and perfectly symmetric on the right and left and favored to be developmental variant. Electronically Signed: Aaron Vergara MD  6/3/2025 6:27 PM EDT  Workstation ID: [id number]      Results for orders placed during the hospital encounter of 06/03/25    Adult Transthoracic Echo Complete W/ Cont if Necessary Per Protocol (With Agitated Saline)    Interpretation Summary    Left ventricular systolic function is normal. Estimated left ventricular EF = 67%    The left atrial cavity is severely dilated.    Mild calcification of the aortic valve with no significant stenosis or regurgitation    There is a trivial pericardial effusion.      Current medications:  Scheduled Meds:aspirin, 325 mg, Oral, Daily  carbidopa-levodopa, 1 tablet, Oral, TID  cefTRIAXone, 1,000 mg, Intravenous, Q24H  heparin (porcine), 5,000 Units, Subcutaneous, Q8H  levothyroxine, 25 mcg, Oral, Q AM  metoprolol succinate XL, 150 mg, Oral, Q24H  polyethylene glycol, 17 g, Oral, Daily  rosuvastatin, 20 mg, Oral, Nightly  senna, 1 tablet, Oral, BID  sodium chloride, 10 mL, Intravenous, Q12H      Continuous Infusions:lactated ringers, 125 mL/hr, Last Rate: 125 mL/hr (06/04/25 1811)      PRN Meds:.  acetaminophen **OR** acetaminophen **OR** acetaminophen    bisacodyl    Calcium Replacement - Follow Nurse / BPA Driven Protocol    Magnesium Standard Dose Replacement - Follow Nurse / BPA Driven Protocol    Phosphorus Replacement - Follow Nurse / BPA Driven Protocol    Potassium Replacement - Follow Nurse / BPA Driven Protocol    sodium chloride    sodium chloride    Assessment & Plan   Assessment & Plan     Active Hospital Problems    Diagnosis  POA    **Dysarthria [R47.1]  Yes    Parkinson disease [G20.A1]  Unknown    Essential hypertension [I10]  Unknown    Hyperlipidemia  [E78.5]  Unknown      Resolved Hospital Problems   No resolved problems to display.        Brief Hospital Course to date:  Lane Dong is a 80 y.o. male with Parkinson's disease, hypertension, hyperlipidemia who presents for dysarthria and facial droop.    Dysarthria-resolving  Facial droop-resolved  - stroke neuro following  - MRI head pending  - Holter monitor at discharge  - Continue aspirin 325 daily for now  - Continue statin 20 mg  - Stroke neurology clinic follow-up 1 month    HTN emergency-resolved  - Blood pressures more appropriate today  - Back on home metoprolol but holding lisinopril in the setting of significant SULMA, will restart as able    Possible UTI  - Patient is febrile though does not have leukocytosis or tachycardia.  UA positive for bacteria though patient does not appear to have symptoms  - No other source of infection identified  - Urine and blood cultures pending  - Will continue Rocephin for now    SULMA  - Unclear baseline  - Creatinine improved after IV fluids  - Continue to monitor    Encephalopathy- resolved    - Likely multifactorial in the setting of hypertensive emergency, dehydration and possible infection  - Patient back to baseline  - Treatment described above    Parkinson's disease  - Continue carbidopa levodopa    Hypertension  Hyperlipidemia  Irregular heartbeat/PVCs  - No known history of A-fib  - Continue home metoprolol    Hypothyroidism, new diagnosis  - Continue levothyroxine    Insomnia  - Given improvement in mental status back to baseline will restart home Seroquel    Expected Discharge Location and Transportation: home with assist   Expected Discharge   Expected discharge date/ time has not been documented.     VTE Prophylaxis:  Pharmacologic & mechanical VTE prophylaxis orders are present.         AM-PAC 6 Clicks Score (PT): 14 (06/04/25 1019)    CODE STATUS:   Code Status and Medical Interventions: CPR (Attempt to Resuscitate); Limited Support; No intubation (DNI)    Ordered at: 06/03/25 2120     Code Status (Patient has no pulse and is not breathing):    CPR (Attempt to Resuscitate)     Medical Interventions (Patient has pulse or is breathing):    Limited Support     Medical Intervention Limits:    No intubation (DNI)     Level Of Support Discussed With:    Health Care Surrogate       Estela Rodriguez MD  06/04/25

## 2025-06-04 NOTE — PROGRESS NOTES
Malnutrition Severity Assessment    Patient Name:  Lane Dong  YOB: 1945  MRN: [id number]  Admit Date:  6/3/2025    Patient meets criteria for : (P) Moderate (non-severe) Malnutrition    Comments:  Pt meets criteria for moderate, acute malnutrition with the indicators of mild muscle wasting and mild subcutaneous fat loss.     Malnutrition Severity Assessment  Malnutrition Type: (P) Acute Disease or Injury - Related Malnutrition  Malnutrition Type (Last 8 Hours)       Malnutrition Severity Assessment       Row Name 06/04/25 1626       Malnutrition Severity Assessment    Malnutrition Type Acute Disease or Injury - Related Malnutrition (P)       [facility] Name 06/04/25 1626       Insufficient Energy Intake     Insufficient Energy Intake Findings -- (P)   ANUJ      Row Name 06/04/25 1626       Unintentional Weight Loss     Unintentional Weight Loss Findings -- (P)   ANUJ; data deficit      Row Name 06/04/25 1626       Muscle Loss    Loss of Muscle Mass Findings Mild (P)     Spiritism Region -- (P)   Mild    Clavicle Bone Region -- (P)   Mild    Acromion Bone Region -- (P)   Mild    Dorsal Hand Region -- (P)   Mild    Patellar Region -- (P)   Mild    Posterior Calf Region -- (P)   Mild      Row Name 06/04/25 1626       Fat Loss    Subcutaneous Fat Loss Findings Mild (P)     Orbital Region  -- (P)   Mild    Upper Arm Region -- (P)   Mild      Row Name 06/04/25 1626       Criteria Met (Must meet criteria for severity in at least 2 of these categories: M Wasting, Fat Loss, Fluid, Secondary Signs, Wt. Status, Intake)    Patient meets criteria for  Moderate (non-severe) Malnutrition (P)                     Electronically signed by:  Kerri Christie  06/04/25 16:28 EDT

## 2025-06-04 NOTE — NURSING NOTE
Notified Md Estela Fontana monitor/EKG shows afib- Md in room and feels as if its NSR- Difficult getting EKG due to parkinsons.

## 2025-06-04 NOTE — PROGRESS NOTES
Stroke Neurology Progress Note     Subjective     This patient was seen in follow-up for: concern for stroke  Present for the encounter were: self, patient, patient's wife    Subjective:  My first encounter with the patient.  No acute events overnight. Patient denies questions or concerns.     Objective      Temp:  [98.5 °F (36.9 °C)-100.6 °F (38.1 °C)] 99.2 °F (37.3 °C)  Heart Rate:  [64-94] 64  Resp:  [18] 18  BP: (153-208)/() 166/89            Objective    Physical Exam:  General Appearance: Eyes closed but alerts for evaluation  HEENT: anicteric sclera, no scleral injection  Lungs: respirations appear comfortable, no obvious increased work of breathing  Extremities: No cyanosis or fingernail clubbing   Skin: No rashes in exposed skin areas     Neurological Examination:   Mental status: Eyes closed but alerts for evaluation.  Oriented.  Mild dysarthria.  Confused appearing.  Cranial Nerves: Visual fields intact. Extraocular movements intact with no nystagmus.  Right eye ptosis  Sensory: Normal sensory exam to light touch.  Motor: Normal tone.   Strength: 2/5 left upper extremity, 3/5 right upper extremity, antigravity in bilateral lower extremities  Tremor: Prominent bilateral upper extremity resting tremor      Labs:    Lab Results   Component Value Date    WBC 6.45 06/03/2025    HGB 13.3 06/03/2025    HCT 41.7 06/03/2025    .5 (H) 06/03/2025     06/03/2025     Lab Results   Component Value Date    GLUCOSE 126 (H) 06/03/2025    BUN 30.7 (H) 06/03/2025    CREATININE 1.86 (H) 06/03/2025    BCR 16.5 06/03/2025    CO2 24.0 06/03/2025    CALCIUM 9.6 06/03/2025    AST 14 06/03/2025    ALT <5 06/03/2025       Results from last 7 days   Lab Units 06/03/25  1824 06/03/25  1823   SODIUM mmol/L 142  --    POTASSIUM mmol/L 4.5  --    CHLORIDE mmol/L 102  --    CO2 mmol/L 24.0  --    BUN mg/dL 30.7*  --    CREATININE mg/dL 1.86* 2.00*   CALCIUM mg/dL 9.6  --    ALT (SGPT) U/L <5  --    AST (SGOT) U/L 14   "--    GLUCOSE mg/dL 126*  --        No results found for: \"BLOODCX\"  UA          6/3/2025    19:46   Urinalysis   Squamous Epithelial Cells, UA None Seen    Specific [place], UA 1.014    Ketones, UA Negative    Blood, UA Trace    Leukocytes, UA Large (3+)    Nitrite, UA Negative    RBC, UA 3-5    WBC, UA Too Numerous to Count    Bacteria, UA 3+        Results Review:      All brain images and reports were personally reviewed and I agree with the interpretations except as noted below.    CT Angiogram Head and Neck 6/3/2025  Impression:  No abrupt cut off/large vessel occlusion, flow-limiting stenosis, dissection, or aneurysm. There is atherosclerosis of the bilateral carotid bifurcations and proximal cervical ICAs without evidence of flow-limiting stenosis (<50% narrowing per NASCET criteria).     CT Perfusion 6/3/2025  Impression: No core infarct or ischemic tissue at risk. Grossly symmetric cerebral perfusion.     CT Head Without Contrast Stroke Protocol: 6/3/2025  Impression: 1. Chronic appearing changes of the aging brain. No evidence of acute intracranial disease is seen. 2. 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 The posterior fusion anomalies resemble subacute or old trauma but are smooth margined and perfectly symmetric on the right and left and favored to be developmental variant.     Transthoracic echocardiogram 6//25  Impression:    Left ventricular systolic function is normal. Estimated left ventricular EF = 67%    The left atrial cavity is severely dilated.    Mild calcification of the aortic valve with no significant stenosis or regurgitation    There is a trivial pericardial effusion.        Assessment/Plan     Assessment:    Lane Dong is an 80-year-old male with a past medical history of HTN, Parkinson disease, HLD, HTN who [facility] with a left facial droop and speech difficulty.  BP " 228/124 on arrival    # Transient left facial droop and speech difficulty  Etiology likely hypertensive emergency in setting of /124 on arrival.  Patient found to have a UTI with UA too numerous to count WBC, 3+ bacteria, 3+ leukocytes.    - MRI brain without contrast pending  - Patient passes bedside swallow screen  - Continue aspirin 325 mg daily for now  - Continue rosuvastatin 20 mg daily LDL pending  - Recommend Holter monitor prior to discharge, left atrial cavity severely dilated  - PT OT evaluated the patient and recommends home with home health  - Neuro-checks every 4 hours while inpatient  - Blood pressure goal: SBP < 180  - DVT prophylaxis: SCD, heparin Cr 1.89  - Follow-up in stroke clinic in 1 month after discharge      Patient education: call 911 or present to emergency department with any stroke symptom, including unilateral face, arm, or leg weakness, numbness, or paresthesias, unilateral facial droop, speech deficits, dizziness with nausea, vomiting, nystagmus, and incoordination, visual deficits, or severe onset headache.    Stroke neurology will follow results of above workup.  Please call with questions.     Arabella Cox MD  [facility] [place] STROKE NEURO  06/04/25  09:04 EDT

## 2025-06-04 NOTE — THERAPY EVALUATION
Patient Name: Lane Dong  : 1945    MRN: [id number]                              Today's Date: 2025       Admit Date: 6/3/2025    Visit Dx:     ICD-10-CM ICD-9-CM   1. Aphasia  R47.01 784.3   2. Uncontrolled hypertension  I10 401.9     Patient Active Problem List   Diagnosis    Dysarthria    Parkinson disease    Essential hypertension    Hyperlipidemia     History reviewed. No pertinent past medical history.  History reviewed. No pertinent surgical history.   General Information       Row Name 25 1126          OT Time and Intention    Document Type evaluation  -CS     Mode of Treatment occupational therapy;co-treatment  -CS       Row Name 25 1126          General Information    Patient Profile Reviewed yes  -CS     Prior Level of Function --  per ,  caregivers 5days/wk, grandson and spouse assist other 2 days. Rollator use at baseline. Pt able to feed self.  -CS     Existing Precautions/Restrictions fall;other (see comments)  PD w/ tremors, limited RUE ROM  -CS     Barriers to Rehab medically complex;previous functional deficit  -CS       Row Name 25 1126          Living Environment    Current Living Arrangements home  -CS     People in Home spouse  -CS       Row Name 25 1126          Home Main Entrance    Number of Stairs, Main Entrance none  -CS       Row Name 25 1126          Stairs Within Home, Primary    Number of Stairs, Within Home, Primary none  -CS       Row Name 25 1126          Cognition    Orientation Status (Cognition) oriented x 4  -CS       Row Name 25 1126          Safety Issues/Impairments Affecting Functional Mobility    Safety Issues Affecting Function (Mobility) insight into deficits/self-awareness;safety precaution awareness;safety precautions follow-through/compliance;awareness of need for assistance  -CS     Impairments Affecting Function (Mobility) balance;endurance/activity tolerance;strength;motor control;motor  planning;postural/trunk control;range of motion (ROM);cognition  -CS     Cognitive Impairments, Mobility Safety/Performance awareness, need for assistance;insight into deficits/self-awareness;problem-solving/reasoning;judgment;safety precaution awareness;safety precaution follow-through;sequencing abilities  -               User Key  (r) = Recorded By, (t) = Taken By, (c) = Cosigned By      Initials Name Provider Type     Malik Aviles OT Occupational Therapist                     Mobility/ADL's       Row Name 06/04/25 1128          Bed Mobility    Bed Mobility supine-sit;scooting/bridging  -     Scooting/Bridging [place] (Bed Mobility) moderate assist (50% patient effort);1 person assist;verbal cues;nonverbal cues (demo/gesture)  -     Supine-Sit [place] (Bed Mobility) moderate assist (50% patient effort);1 person assist;verbal cues;nonverbal cues (demo/gesture)  -     Bed Mobility, Safety Issues decreased use of arms for pushing/pulling;decreased use of legs for bridging/pushing;cognitive deficits limit understanding;impaired trunk control for bed mobility  -     Assistive Device (Bed Mobility) head of bed elevated;bed rails  -     Comment, (Bed Mobility) verbal/tactile cues for sequencing BLEs to EOB, increased assist at trunk to achieve sitting posture  -       Row Name 06/04/25 1128          Transfers    Transfers sit-stand transfer  -     Comment, (Transfers) cues for improved safety/sequencing with RW use  -       Row Name 06/04/25 1128          Sit-Stand Transfer    Sit-Stand [place] (Transfers) minimum assist (75% patient effort);2 person assist;nonverbal cues (demo/gesture);verbal cues  -     Assistive Device (Sit-Stand Transfers) walker, front-wheeled  -       Row Name 06/04/25 1128          Functional Mobility    Functional Mobility- Comment defer to PT for gait specifics, short steps with navigational cues  -CS     Patient was able to Ambulate yes  -CS        [facility] Name 06/04/25 1128          Activities of Daily Living    BADL Assessment/Intervention lower body dressing;grooming;feeding  -[facility] Name 06/04/25 1128          Lower Body Dressing Assessment/Training    [place] Level (Lower Body Dressing) don;socks;dependent (less than 25% patient effort)  -     Position (Lower Body Dressing) edge of bed sitting  -[facility] Name 06/04/25 1128          Grooming Assessment/Training    [place] Level (Grooming) wash face, hands;hair care, combing/brushing;moderate assist (50% patient effort);maximum assist (25% patient effort)  -     Position (Grooming) supported sitting  -[facility] Name 06/04/25 1128          Self-Feeding Assessment/Training    [place] Level (Feeding) feeding skills;other (see comments)  -CS     Comment, (Feeding) assist at baseline, will assess for feeding tools once meds take effect and extent of tremors assessed  -               User Key  (r) = Recorded By, (t) = Taken By, (c) = Cosigned By      Initials Name Provider Type     Malik Aviles, OT Occupational Therapist                   Obj/Interventions       [facility] Name 06/04/25 1131          Sensory Assessment (Somatosensory)    Sensory Assessment (Somatosensory) UE sensation intact  -[facility] Name 06/04/25 1131          Vision Assessment/Intervention    Visual Impairment/Limitations WFL  -[facility] Name 06/04/25 1131          Range of Motion Comprehensive    General Range of Motion upper extremity range of motion deficits identified  -CS     Comment, General Range of Motion L shoulder flex/abd limited to approx 90 degrees, otherwise grossly WFL  -[facility] Name 06/04/25 1131          Strength Comprehensive (MMT)    General Manual Muscle Testing (MMT) Assessment upper extremity strength deficits identified  -     Comment, General Manual Muscle Testing (MMT) Assessment BUE grossly 4/5  -[facility] Name 06/04/25 1131          Motor Skills    Motor Skills  neuro-muscular function  -CS     Neuromuscular Function bilateral;upper extremity;tremor, intention;tremor, resting  -CS       Row Name 06/04/25 1131          Balance    Balance Assessment sitting static balance;sitting dynamic balance;standing static balance;standing dynamic balance  -CS     Static Sitting Balance standby assist  -CS     Dynamic Sitting Balance contact guard  -CS     Position, Sitting Balance unsupported;sitting edge of bed  -CS     Static Standing Balance minimal assist;1-person assist;1 person to manage equipment  -CS     Dynamic Standing Balance minimal assist;1-person assist;1 person to manage equipment  -CS     Position/Device Used, Standing Balance supported;walker, front-wheeled  -CS     Balance Interventions sitting;standing;sit to stand;occupation based/functional task  -CS               User Key  (r) = Recorded By, (t) = Taken By, (c) = Cosigned By      Initials Name Provider Type    CS Malik Aviles, OT Occupational Therapist                   Goals/Plan       Row Name 06/04/25 1147          Bed Mobility Goal 1 (OT)    Activity/Assistive Device (Bed Mobility Goal 1, OT) sit to supine/supine to sit  -CS     [place] Level/Cues Needed (Bed Mobility Goal 1, OT) contact guard required  -CS     Time Frame (Bed Mobility Goal 1, OT) short term goal (STG);5 days  -CS     Progress/Outcomes (Bed Mobility Goal 1, OT) new goal  -CS       Row Name 06/04/25 1143          Toileting Goal 1 (OT)    Activity/Device (Toileting Goal 1, OT) adjust/manage clothing;perform perineal hygiene  -CS     [place] Level/Cues Needed (Toileting Goal 1, OT) minimum assist (75% or more patient effort)  -CS     Time Frame (Toileting Goal 1, OT) long term goal (LTG);1 week  -CS     Progress/Outcome (Toileting Goal 1, OT) new goal  -CS       Row Name 06/04/25 1145          Self-Feeding Goal 1 (OT)    Activity/Device (Self-Feeding Goal 1, OT) finger foods;scoop food and bring to mouth;adapted cup  -CS      [place] Level/Cues Needed (Self-Feeding Goal 1, OT) modified independence  -CS     Time Frame (Self-Feeding Goal 1, OT) short term goal (STG);3 days  -CS     Progress/Outcomes (Self-Feeding Goal 1, OT) new goal  -CS       Row Name 06/04/25 1147          Therapy Assessment/Plan (OT)    Planned Therapy Interventions (OT) activity tolerance training;functional balance retraining;occupation/activity based interventions;ROM/therapeutic exercise;strengthening exercise;patient/caregiver education/training;transfer/mobility retraining;neuromuscular control/coordination retraining;BADL retraining;adaptive equipment training;cognitive/visual perception retraining  -CS               User Key  (r) = Recorded By, (t) = Taken By, (c) = Cosigned By      Initials Name Provider Type    CS Malik Aviles, DUNCAN Occupational Therapist                   Clinical Impression       Row Name 06/04/25 1139          Pain Assessment    Pretreatment Pain Rating 0/10 - no pain  -CS     Posttreatment Pain Rating 0/10 - no pain  -CS       Row Name 06/04/25 1139          Plan of Care Review    Plan of Care Reviewed With patient  -CS     Progress no change  -CS     Outcome Evaluation Pt presents with BUE tremors alongside strength, balance, and endurance deficits warranting IP OT services to promote return to and maintenance of PLOF. Rec d/c to home with continued 24/7 care and HHOT/PT.  -CS       Row Name 06/04/25 1133          Therapy Assessment/Plan (OT)    Rehab Potential (OT) good  -CS     Criteria for Skilled Therapeutic Interventions Met (OT) yes;meets criteria;skilled treatment is necessary  -CS     Therapy Frequency (OT) daily  -CS       Row Name 06/04/25 1139          Therapy Plan Review/Discharge Plan (OT)    Anticipated Discharge Disposition (OT) home with 24/7 care;home with home health  -CS       Row Name 06/04/25 1137          Vital Signs    Pre Systolic BP Rehab 141  -CS     Pre Treatment Diastolic BP 92  -CS     Post Systolic  BP Rehab 168  -CS     Post Treatment Diastolic   -CS     O2 Delivery Pre Treatment room air  -CS     O2 Delivery Intra Treatment room air  -CS     O2 Delivery Post Treatment room air  -CS     Pre Patient Position Supine  -CS     Post Patient Position Sitting  -CS       Row Name 06/04/25 1139          Positioning and Restraints    Pre-Treatment Position in bed  -CS     Post Treatment Position chair  -CS     In Chair notified nsg;reclined;sitting;call light within reach;encouraged to call for assist;exit alarm on;with nsg;waffle cushion;legs elevated;heels elevated  -CS               User Key  (r) = Recorded By, (t) = Taken By, (c) = Cosigned By      Initials Name Provider Type    CS Malik Aviles, OT Occupational Therapist                   Outcome Measures       Row Name 06/04/25 1150          How much help from another is currently needed...    Putting on and taking off regular lower body clothing? 2  -CS     Bathing (including washing, rinsing, and drying) 2  -CS     Toileting (which includes using toilet bed pan or urinal) 2  -CS     Putting on and taking off regular upper body clothing 3  -CS     Taking care of personal grooming (such as brushing teeth) 3  -CS     Eating meals 3  -CS     AM-PAC 6 Clicks Score (OT) 15  -CS       Row Name 06/04/25 1019          How much help from another person do you currently need...    Turning from your back to your side while in flat bed without using bedrails? 2  -KR     Moving from lying on back to sitting on the side of a flat bed without bedrails? 2  -KR     Moving to and from a bed to a chair (including a wheelchair)? 3  -KR     Standing up from a chair using your arms (e.g., wheelchair, bedside chair)? 2  -KR     Climbing 3-5 steps with a railing? 2  -KR     To walk in hospital room? 3  -KR     AM-PAC 6 Clicks Score (PT) 14  -KR     Highest Level of Mobility Goal Move to Chair/Commode-4  -KR       Row Name 06/04/25 1150 06/04/25 1019       Modified [place] Scale     Pre-Stroke Modified Sch[place] Scale -- 6 - Unable to determine (UTD) from the medical record documentation  -KR    Modified [place] Scale 4 - Moderately severe disability.  Unable to walk without assistance, and unable to attend to own bodily needs without assistance.  -CS 4 - Moderately severe disability.  Unable to walk without assistance, and unable to attend to own bodily needs without assistance.  -KR      Row Name 06/04/25 1150 06/04/25 1019       Functional Assessment    Outcome Measure Options AM-PAC 6 Clicks Daily Activity (OT);Modified Schley  -CS AM-PAC 6 Clicks Basic Mobility (PT);Modified Schley  -KR              User Key  (r) = Recorded By, (t) = Taken By, (c) = Cosigned By      Initials Name Provider Type     Malik Aviles, OT Occupational Therapist    Sendy Gonzalez, PT Physical Therapist                    Occupational Therapy Education       Title: PT OT SLP Therapies (In Progress)       Topic: Occupational Therapy (Done)       Point: ADL training (Done)       Learning Progress Summary            Patient Acceptance, E,D, NR,VU by  at 6/4/2025 1150   Family Acceptance, E,D, NR,VU by  at 6/4/2025 1150                      Point: Home exercise program (Done)       Learning Progress Summary            Patient Acceptance, E,D, NR,VU by  at 6/4/2025 1150   Family Acceptance, E,D, NR,VU by  at 6/4/2025 1150                      Point: Precautions (Done)       Learning Progress Summary            Patient Acceptance, E,D, NR,VU by  at 6/4/2025 1150   Family Acceptance, E,D, NR,VU by  at 6/4/2025 1150                      Point: Body mechanics (Done)       Learning Progress Summary            Patient Acceptance, E,D, NR,VU by  at 6/4/2025 1150   Family Acceptance, E,D, NR,VU by  at 6/4/2025 1150                                      User Key       Initials Effective Dates Name Provider Type [facility] 06/16/21 -  Malik Aviles OT Occupational Therapist OT                  OT  Recommendation and Plan  Planned Therapy Interventions (OT): activity tolerance training, functional balance retraining, occupation/activity based interventions, ROM/therapeutic exercise, strengthening exercise, patient/caregiver education/training, transfer/mobility retraining, neuromuscular control/coordination retraining, BADL retraining, adaptive equipment training, cognitive/visual perception retraining  Therapy Frequency (OT): daily  Plan of Care Review  Plan of Care Reviewed With: patient  Progress: no change  Outcome Evaluation: Pt presents with BUE tremors alongside strength, balance, and endurance deficits warranting IP OT services to promote return to and maintenance of PLOF. Rec d/c to home with continued 24/7 care and HHOT/PT.     Time Calculation:   Evaluation Complexity (OT)  Review Occupational Profile/Medical/Therapy History Complexity: expanded/moderate complexity  Assessment, Occupational Performance/Identification of Deficit Complexity: 3-5 performance deficits  Clinical Decision Making Complexity (OT): detailed assessment/moderate complexity  Overall Complexity of Evaluation (OT): moderate complexity     Time Calculation- OT       Row Name 06/04/25 1151             Time Calculation- OT    OT Start Time 0825  -CS      OT Received On 06/04/25  -CS      OT Goal Re-Cert Due Date 06/14/25  -CS         Untimed Charges    OT Eval/Re-eval Minutes 52  -CS         Total Minutes    Untimed Charges Total Minutes 52  -CS       Total Minutes 52  -CS                User Key  (r) = Recorded By, (t) = Taken By, (c) = Cosigned By      Initials Name Provider Type    CS Malik Aviles, OT Occupational Therapist                  Therapy Charges for Today       Code Description Service Date Service Provider Modifiers Qty    [id number] HC OT EVAL MOD COMPLEXITY 4 6/4/2025 Malik Aviles OT GO 1                 Malik Aviles OT  6/4/2025

## 2025-06-05 ENCOUNTER — APPOINTMENT (OUTPATIENT)
Dept: MRI IMAGING | Facility: HOSPITAL | Age: 80
End: 2025-06-05
Payer: MEDICARE

## 2025-06-05 VITALS
TEMPERATURE: 98.6 F | SYSTOLIC BLOOD PRESSURE: 140 MMHG | OXYGEN SATURATION: 93 % | WEIGHT: 175 LBS | RESPIRATION RATE: 16 BRPM | DIASTOLIC BLOOD PRESSURE: 55 MMHG | BODY MASS INDEX: 23.7 KG/M2 | HEART RATE: 67 BPM | HEIGHT: 72 IN

## 2025-06-05 LAB
ANION GAP SERPL CALCULATED.3IONS-SCNC: 12 MMOL/L (ref 5–15)
BACTERIA SPEC AEROBE CULT: ABNORMAL
BUN SERPL-MCNC: 25.4 MG/DL (ref 8–23)
BUN/CREAT SERPL: 14.9 (ref 7–25)
CALCIUM SPEC-SCNC: 9.1 MG/DL (ref 8.6–10.5)
CHLORIDE SERPL-SCNC: 105 MMOL/L (ref 98–107)
CO2 SERPL-SCNC: 28 MMOL/L (ref 22–29)
CREAT SERPL-MCNC: 1.71 MG/DL (ref 0.76–1.27)
DEPRECATED RDW RBC AUTO: 49.2 FL (ref 37–54)
EGFRCR SERPLBLD CKD-EPI 2021: 39.7 ML/MIN/1.73
ERYTHROCYTE [DISTWIDTH] IN BLOOD BY AUTOMATED COUNT: 13 % (ref 12.3–15.4)
GLUCOSE SERPL-MCNC: 149 MG/DL (ref 65–99)
HCT VFR BLD AUTO: 40.1 % (ref 37.5–51)
HGB BLD-MCNC: 12.7 G/DL (ref 13–17.7)
MAGNESIUM SERPL-MCNC: 2.1 MG/DL (ref 1.6–2.4)
MCH RBC QN AUTO: 32.2 PG (ref 26.6–33)
MCHC RBC AUTO-ENTMCNC: 31.7 G/DL (ref 31.5–35.7)
MCV RBC AUTO: 101.5 FL (ref 79–97)
PLATELET # BLD AUTO: 147 10*3/MM3 (ref 140–450)
PMV BLD AUTO: 9.4 FL (ref 6–12)
POTASSIUM SERPL-SCNC: 4.1 MMOL/L (ref 3.5–5.2)
RBC # BLD AUTO: 3.95 10*6/MM3 (ref 4.14–5.8)
SODIUM SERPL-SCNC: 145 MMOL/L (ref 136–145)
WBC NRBC COR # BLD AUTO: 6.15 10*3/MM3 (ref 3.4–10.8)

## 2025-06-05 PROCEDURE — 85027 COMPLETE CBC AUTOMATED: CPT | Performed by: STUDENT IN AN ORGANIZED HEALTH CARE EDUCATION/TRAINING PROGRAM

## 2025-06-05 PROCEDURE — 25010000002 HYDRALAZINE PER 20 MG

## 2025-06-05 PROCEDURE — 80048 BASIC METABOLIC PNL TOTAL CA: CPT | Performed by: STUDENT IN AN ORGANIZED HEALTH CARE EDUCATION/TRAINING PROGRAM

## 2025-06-05 PROCEDURE — 83735 ASSAY OF MAGNESIUM: CPT | Performed by: STUDENT IN AN ORGANIZED HEALTH CARE EDUCATION/TRAINING PROGRAM

## 2025-06-05 PROCEDURE — 99239 HOSP IP/OBS DSCHRG MGMT >30: CPT | Performed by: STUDENT IN AN ORGANIZED HEALTH CARE EDUCATION/TRAINING PROGRAM

## 2025-06-05 PROCEDURE — 70551 MRI BRAIN STEM W/O DYE: CPT

## 2025-06-05 PROCEDURE — 25010000002 HEPARIN (PORCINE) PER 1000 UNITS: Performed by: STUDENT IN AN ORGANIZED HEALTH CARE EDUCATION/TRAINING PROGRAM

## 2025-06-05 PROCEDURE — 25010000002 CEFTRIAXONE PER 250 MG: Performed by: STUDENT IN AN ORGANIZED HEALTH CARE EDUCATION/TRAINING PROGRAM

## 2025-06-05 PROCEDURE — 99232 SBSQ HOSP IP/OBS MODERATE 35: CPT

## 2025-06-05 RX ORDER — LEVOTHYROXINE SODIUM 25 UG/1
25 TABLET ORAL
Qty: 30 TABLET | Refills: 0 | Status: SHIPPED | OUTPATIENT
Start: 2025-06-06

## 2025-06-05 RX ORDER — ASPIRIN 81 MG/1
81 TABLET, CHEWABLE ORAL DAILY
Status: DISCONTINUED | OUTPATIENT
Start: 2025-06-05 | End: 2025-06-05 | Stop reason: HOSPADM

## 2025-06-05 RX ORDER — SENNOSIDES 8.6 MG/1
1 TABLET ORAL 2 TIMES DAILY
Qty: 30 TABLET | Refills: 0 | Status: SHIPPED | OUTPATIENT
Start: 2025-06-05 | End: 2025-06-05

## 2025-06-05 RX ORDER — SENNOSIDES 8.6 MG/1
1 TABLET ORAL 2 TIMES DAILY
Qty: 30 TABLET | Refills: 0 | Status: SHIPPED | OUTPATIENT
Start: 2025-06-05

## 2025-06-05 RX ORDER — AMOXICILLIN 500 MG/1
500 CAPSULE ORAL 3 TIMES DAILY
Qty: 9 CAPSULE | Refills: 0 | Status: SHIPPED | OUTPATIENT
Start: 2025-06-06 | End: 2025-06-05

## 2025-06-05 RX ORDER — LISINOPRIL 10 MG/1
10 TABLET ORAL DAILY
Status: DISCONTINUED | OUTPATIENT
Start: 2025-06-05 | End: 2025-06-05 | Stop reason: HOSPADM

## 2025-06-05 RX ORDER — AMOXICILLIN 500 MG/1
500 CAPSULE ORAL 3 TIMES DAILY
Qty: 9 CAPSULE | Refills: 0 | Status: SHIPPED | OUTPATIENT
Start: 2025-06-06 | End: 2025-06-09

## 2025-06-05 RX ORDER — LEVOTHYROXINE SODIUM 25 UG/1
25 TABLET ORAL
Qty: 30 TABLET | Refills: 0 | Status: SHIPPED | OUTPATIENT
Start: 2025-06-06 | End: 2025-06-05

## 2025-06-05 RX ADMIN — SENNOSIDES 1 TABLET: 8.6 TABLET, FILM COATED ORAL at 09:19

## 2025-06-05 RX ADMIN — CARBIDOPA AND LEVODOPA 1 TABLET: 25; 100 TABLET ORAL at 09:19

## 2025-06-05 RX ADMIN — HEPARIN SODIUM 5000 UNITS: 5000 INJECTION INTRAVENOUS; SUBCUTANEOUS at 05:10

## 2025-06-05 RX ADMIN — SODIUM CHLORIDE 1000 MG: 900 INJECTION INTRAVENOUS at 16:52

## 2025-06-05 RX ADMIN — METOPROLOL SUCCINATE 150 MG: 50 TABLET, EXTENDED RELEASE ORAL at 09:19

## 2025-06-05 RX ADMIN — Medication 10 ML: at 09:19

## 2025-06-05 RX ADMIN — POLYETHYLENE GLYCOL 3350 17 G: 17 POWDER, FOR SOLUTION ORAL at 09:19

## 2025-06-05 RX ADMIN — ASPIRIN 81 MG: 81 TABLET, CHEWABLE ORAL at 09:19

## 2025-06-05 RX ADMIN — LEVOTHYROXINE SODIUM 25 MCG: 0.03 TABLET ORAL at 05:10

## 2025-06-05 RX ADMIN — HYDRALAZINE HYDROCHLORIDE 10 MG: 20 INJECTION INTRAMUSCULAR; INTRAVENOUS at 00:30

## 2025-06-05 RX ADMIN — HEPARIN SODIUM 5000 UNITS: 5000 INJECTION INTRAVENOUS; SUBCUTANEOUS at 13:18

## 2025-06-05 RX ADMIN — LISINOPRIL 10 MG: 10 TABLET ORAL at 13:18

## 2025-06-05 NOTE — NURSING NOTE
Pt A+Ox4, low locked bed, alarm on and activated, room air. Pt temp has been slightly elevated throughout shift, most recent 99.2 degrees Farenheit. NIH 2. Pt has consistently had high Bps throughout shift. Hydralazine given once, but BP has continued to stay high. Pt has severe Parkinson's tremors and monitor BPs have not been accurate; manual BPs have also been high, but lower than machine. MRI completed, pending results.

## 2025-06-05 NOTE — DISCHARGE SUMMARY
[facility] Medicine Services  DISCHARGE SUMMARY    Patient Name: Lane Dong  : 1945  MRN: [id number]    Date of Admission: 6/3/2025  6:23 PM  Date of Discharge:  25  Primary Care Physician: Elbert King MD    Consults       Date and Time Order Name Status Description    6/3/2025  6:14 PM Inpatient Neurology Consult Stroke Completed             Hospital Course     Presenting Problem: dysarthria     Active Hospital Problems    Diagnosis  POA    **Dysarthria [R47.1]  Yes    Moderate protein-calorie malnutrition [E44.0]  Yes    Parkinson disease [G20.A1]  Unknown    Essential hypertension [I10]  Unknown    Hyperlipidemia [E78.5]  Unknown      Resolved Hospital Problems   No resolved problems to display.          Hospital Course:  Lane Dong is a 80 y.o. male with Parkinson's disease, hypertension, hyperlipidemia presenting for dysarthria and facial droop.    TIA  -Etiology thought to be multifactorial in the setting of hypertensive emergency and UTI.  -Stroke neurology saw patient while he was here  -MRI without acute infarct  -Patient to home aspirin 1 mg daily and rosuvastatin 20 mg daily  -Holter monitor will be mailed to him     Hypertensive emergency-resolved  -Patient has never had this severe of issues with blood pressure before.  Possibly exacerbated by UTI  - Blood pressure under good control once home medications restarted  - Detailed discussion with family today about blood pressure monitoring.  - Continue home lisinopril and metoprolol    UTI  - UA growing strep agalactiae  - Did well on 3 days of Rocephin, will send 2 additional days of amoxicillin to complete 5 days of therapy      Discharge Follow Up Recommendations for outpatient labs/diagnostics:  PCP follow-up next week  Stroke neurology follow-up 1 month    Day of Discharge     HPI:   Patient feeling well this morning.  Family updated at bedside.  Patient only going home today with home  health.    Review of Systems   Respiratory:  Negative for cough and shortness of breath.    Cardiovascular:  Negative for chest pain.   Gastrointestinal:  Negative for abdominal pain, nausea and vomiting.         Vital Signs:   Temp:  [98.1 °F (36.7 °C)-99.9 °F (37.7 °C)] 98.6 °F (37 °C)  Heart Rate:  [59-71] 67  Resp:  [15-16] 16  BP: (140-185)/() 140/55      Physical Exam  Constitutional:       General: He is not in acute distress.     Appearance: He is not ill-appearing.   HENT:      Head: Normocephalic and atraumatic.      Nose: Nose normal.   Pulmonary:      Effort: Pulmonary effort is normal. No respiratory distress.   Musculoskeletal:      Comments: Resting tremor   Neurological:      Mental Status: Mental status is at baseline.          Pertinent  and/or Most Recent Results     LAB RESULTS:      Lab 06/05/25  1329 06/03/25  1824 06/03/25  1823   WBC 6.15 6.45  --    HEMOGLOBIN 12.7* 13.3  --    HEMOGLOBIN, POC  --   --  13.9   HEMATOCRIT 40.1 41.7  --    HEMATOCRIT POC  --   --  41   PLATELETS 147 152  --    NEUTROS ABS  --  4.54  --    IMMATURE GRANS (ABS)  --  0.02  --    LYMPHS ABS  --  0.95  --    MONOS ABS  --  0.81  --    EOS ABS  --  0.09  --    .5* 100.5*  --    PROCALCITONIN  --  0.10  --    PROTIME  --  14.6  --    APTT  --  36.0  --          Lab 06/05/25  1331 06/04/25  1237 06/04/25  1236 06/03/25  1824 06/03/25  1823   SODIUM 145 139  --  142  --    POTASSIUM 4.1 4.3  --  4.5  --    CHLORIDE 105 102  --  102  --    CO2 28.0 26.0  --  24.0  --    ANION GAP 12.0 11.0  --  16.0*  --    BUN 25.4* 29.3*  --  30.7*  --    CREATININE 1.71* 1.77*  --  1.86* 2.00*   EGFR 39.7* 38.1*  --  35.9* 32.9*   GLUCOSE 149* 133*  --  126*  --    CALCIUM 9.1 8.9  --  9.6  --    MAGNESIUM 2.1 2.0  --   --   --    HEMOGLOBIN A1C  --   --  5.80*  --   --    TSH  --   --   --  31.600*  --          Lab 06/03/25  1824   ALT (SGPT) <5   AST (SGOT) 14         Lab 06/03/25  1824   PROTIME 14.6   INR 1.08          Lab 06/04/25  1237   CHOLESTEROL 111   LDL CHOL 52   HDL CHOL 42   TRIGLYCERIDES 89             Lab 06/03/25  2359   FIO2 21   CARBOXYHEMOGLOBIN (VENOUS) 1.2     Brief Urine Lab Results  (Last result in the past 365 days)        Color   Clarity   Blood   Leuk Est   Nitrite   Protein   CREAT   Urine HCG        06/03/25 1946             23.5         06/03/25 1946 Yellow   Cloudy   Trace   Large (3+)   Negative   30 mg/dL (1+)                 Microbiology Results (last 10 days)       Procedure Component Value - Date/Time    Blood Culture - Blood, Hand, Right [[id number]]  (Normal) Collected: 06/04/25 1241    Lab Status: Preliminary result Specimen: Blood from Hand, Right Updated: 06/05/25 1315     Blood Culture No growth at 24 hours    Narrative:      Less than seven (7) mL's of blood was collected.  Insufficient quantity may yield false negative results.    Blood Culture - Blood, Hand, Left [[id number]]  (Normal) Collected: 06/04/25 1237    Lab Status: Preliminary result Specimen: Blood from Hand, Left Updated: 06/05/25 1315     Blood Culture No growth at 24 hours    Narrative:      Less than seven (7) mL's of blood was collected.  Insufficient quantity may yield false negative results.    Urine Culture - Urine, Urine, Clean Catch [[id number]]  (Abnormal) Collected: 06/03/25 1946    Lab Status: Final result Specimen: Urine, Clean Catch Updated: 06/05/25 1354     Urine Culture >100,000 CFU/mL Streptococcus agalactiae (Group B)     Comment:   This organism is considered to be universally susceptible to penicillin.  No further antibiotic testing will be performed.       Narrative:      Colonization of the urinary tract without infection is common. Treatment is discouraged unless the patient is symptomatic, pregnant, or undergoing an invasive urologic procedure.            MRI Brain Without Contrast  Result Date: 6/5/2025  MRI BRAIN WO CONTRAST Date of Exam: 6/5/2025 3:40 AM EDT Indication: Stroke, follow up transient  speech difficult and left FD.  Comparison: None available. Technique:  Routine multiplanar/multisequence sequence images of the brain were obtained without contrast administration. Findings: No acute infarct is present on diffusion weighted sequences. Midline structures are normal and the craniocervical junction appears satisfactory. Age-related changes are present with relatively advanced generalized volume loss and typical pontine and periventricular leukomalacia. There is otherwise no evidence of intracranial hemorrhage, mass or mass effect. There is ex vacuo prominence of the ventricles and sulci. The orbits are normal. The paranasal sinuses are grossly clear.     Impression: Advanced age-related changes are noted as above. There is otherwise no evidence of acute infarct, hemorrhage, mass or mass effect. Electronically Signed: Alex Lorenzo MD  6/5/2025 6:43 AM EDT  Workstation ID: [id number]    XR Chest 1 View  Result Date: 6/3/2025  XR CHEST 1 VW Date of Exam: 6/3/2025 6:45 PM EDT Indication: Acute Stroke Protocol (onset < 12 hrs) Comparison: None available. Findings: Patient is rotated to the left. Heart shadow appears in the upper range of normal size. Pulmonary vasculature appears upper normal as well. There is mild generalized coarsening of the pulmonary interstitial markings which may be chronic. There appears to  be a small area of discoid atelectasis in the left lung base. No other focal lung disease is seen. No edema, effusion or pneumothorax is seen.     Impression: 1. Borderline heart shadow enlargement and mild pulmonary venous hypertension. 2. Mild left basilar discoid atelectasis and mild nonspecific pulmonary interstitial changes elsewhere which may be chronic. Electronically Signed: Aaron Vergara MD  6/3/2025 7:43 PM EDT  Workstation ID: [id number]    CT Angiogram Head w AI Analysis of LVO  Result Date: 6/3/2025  CT CEREBRAL PERFUSION W WO CONTRAST, CT ANGIOGRAM NECK, CT ANGIOGRAM HEAD W AI ANALYSIS  OF LVO Date of Exam: 6/3/2025 6:27 PM EDT Indication: Neuro Deficit, acute, Stroke suspected Neuro deficit, acute stroke suspected.  Comparison: Concurrent noncontrast head CT Technique: Axial CT images of the brain were obtained prior to and after the administration of 115 mL Isovue-370. Core blood volume, core blood flow, mean transit time, and Tmax images were obtained utilizing the Rapid software protocol. A limited CT angiogram of the head was also performed to measure the blood vessel density. CTA of the head and neck was performed after the uneventful intravenous administration of above contrast. Reconstructed coronal and sagittal images were also obtained. In addition, a 3-D volume rendered image was created for interpretation. Automated exposure control and iterative reconstruction methods were used. The radiation dose reduction device was turned on for each scan per the ALARA (As Low as Reasonably Achievable) protocol. Findings: CT cerebral perfusion: No core infarct or ischemic tissue at risk. Grossly symmetric cerebral perfusion. Mild patchy hypoperfusion in the watershed regions. CTA HEAD: No abrupt cut off/large vessel occlusion, flow-limiting stenosis, dissection, or aneurysm. The cerebral veins and dural venous sinuses are grossly patent. There is atherosclerosis of the bilateral carotid siphons without flow-limiting stenosis. There is atherosclerosis of the right greater than left V4 segments without flow-limiting stenosis. CTA NECK: No abrupt cut off/large vessel occlusion, flow-limiting stenosis, dissection, or aneurysm. There is atherosclerosis at the bilateral carotid bifurcations and proximal cervical ICAs which results in some luminal irregularity without evidence of flow-limiting stenosis (<50% narrowing per NASCET criteria). There is some tortuosity of the bilateral cervical ICAs which can be seen with longstanding hypertension. There is some atherosclerosis at the aortic arch and left  subclavian artery origin. Extravascular findings: Lung apices are grossly clear. The patient is edentulous. 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 No acute or suspicious bony findings. There is bony fusion of the vertebral bodies and posterior elements of C2 and C3.     Impression: No core infarct or ischemic tissue at risk. Grossly symmetric cerebral perfusion. No abrupt cut off/large vessel occlusion, flow-limiting stenosis, dissection, or aneurysm. There is atherosclerosis of the bilateral carotid bifurcations and proximal cervical ICAs without evidence of flow-limiting stenosis (<50% narrowing per NASCET criteria). Electronically Signed: Kristopher Zepeda MD  6/3/2025 7:18 PM EDT  Workstation ID: [id number]    CT Angiogram Neck  Result Date: 6/3/2025  CT CEREBRAL PERFUSION W WO CONTRAST, CT ANGIOGRAM NECK, CT ANGIOGRAM HEAD W AI ANALYSIS OF LVO Date of Exam: 6/3/2025 6:27 PM EDT Indication: Neuro Deficit, acute, Stroke suspected Neuro deficit, acute stroke suspected.  Comparison: Concurrent noncontrast head CT Technique: Axial CT images of the brain were obtained prior to and after the administration of 115 mL Isovue-370. Core blood volume, core blood flow, mean transit time, and Tmax images were obtained utilizing the Rapid software protocol. A limited CT angiogram of the head was also performed to measure the blood vessel density. CTA of the head and neck was performed after the uneventful intravenous administration of above contrast. Reconstructed coronal and sagittal images were also obtained. In addition, a 3-D volume rendered image was created for interpretation. Automated exposure control and iterative reconstruction methods were used. The radiation dose reduction device was turned on for each scan per the ALARA (As Low as Reasonably Achievable) protocol. Findings: CT cerebral perfusion: No core infarct or ischemic tissue at risk. Grossly symmetric cerebral perfusion. Mild patchy  hypoperfusion in the watershed regions. CTA HEAD: No abrupt cut off/large vessel occlusion, flow-limiting stenosis, dissection, or aneurysm. The cerebral veins and dural venous sinuses are grossly patent. There is atherosclerosis of the bilateral carotid siphons without flow-limiting stenosis. There is atherosclerosis of the right greater than left V4 segments without flow-limiting stenosis. CTA NECK: No abrupt cut off/large vessel occlusion, flow-limiting stenosis, dissection, or aneurysm. There is atherosclerosis at the bilateral carotid bifurcations and proximal cervical ICAs which results in some luminal irregularity without evidence of flow-limiting stenosis (<50% narrowing per NASCET criteria). There is some tortuosity of the bilateral cervical ICAs which can be seen with longstanding hypertension. There is some atherosclerosis at the aortic arch and left subclavian artery origin. Extravascular findings: Lung apices are grossly clear. The patient is edentulous. 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 No acute or suspicious bony findings. There is bony fusion of the vertebral bodies and posterior elements of C2 and C3.     Impression: No core infarct or ischemic tissue at risk. Grossly symmetric cerebral perfusion. No abrupt cut off/large vessel occlusion, flow-limiting stenosis, dissection, or aneurysm. There is atherosclerosis of the bilateral carotid bifurcations and proximal cervical ICAs without evidence of flow-limiting stenosis (<50% narrowing per NASCET criteria). Electronically Signed: Kristopher Zepeda MD  6/3/2025 7:18 PM EDT  Workstation ID: [id number]    CT CEREBRAL PERFUSION WITH & WITHOUT CONTRAST  Result Date: 6/3/2025  CT CEREBRAL PERFUSION W WO CONTRAST, CT ANGIOGRAM NECK, CT ANGIOGRAM HEAD W AI ANALYSIS OF LVO Date of Exam: 6/3/2025 6:27 PM EDT Indication: Neuro Deficit, acute, Stroke suspected Neuro deficit, acute stroke suspected.  Comparison: Concurrent noncontrast head CT  Technique: Axial CT images of the brain were obtained prior to and after the administration of 115 mL Isovue-370. Core blood volume, core blood flow, mean transit time, and Tmax images were obtained utilizing the Rapid software protocol. A limited CT angiogram of the head was also performed to measure the blood vessel density. CTA of the head and neck was performed after the uneventful intravenous administration of above contrast. Reconstructed coronal and sagittal images were also obtained. In addition, a 3-D volume rendered image was created for interpretation. Automated exposure control and iterative reconstruction methods were used. The radiation dose reduction device was turned on for each scan per the ALARA (As Low as Reasonably Achievable) protocol. Findings: CT cerebral perfusion: No core infarct or ischemic tissue at risk. Grossly symmetric cerebral perfusion. Mild patchy hypoperfusion in the watershed regions. CTA HEAD: No abrupt cut off/large vessel occlusion, flow-limiting stenosis, dissection, or aneurysm. The cerebral veins and dural venous sinuses are grossly patent. There is atherosclerosis of the bilateral carotid siphons without flow-limiting stenosis. There is atherosclerosis of the right greater than left V4 segments without flow-limiting stenosis. CTA NECK: No abrupt cut off/large vessel occlusion, flow-limiting stenosis, dissection, or aneurysm. There is atherosclerosis at the bilateral carotid bifurcations and proximal cervical ICAs which results in some luminal irregularity without evidence of flow-limiting stenosis (<50% narrowing per NASCET criteria). There is some tortuosity of the bilateral cervical ICAs which can be seen with longstanding hypertension. There is some atherosclerosis at the aortic arch and left subclavian artery origin. Extravascular findings: Lung apices are grossly clear. The patient is edentulous. 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 No acute or  suspicious bony findings. There is bony fusion of the vertebral bodies and posterior elements of C2 and C3.     Impression: No core infarct or ischemic tissue at risk. Grossly symmetric cerebral perfusion. No abrupt cut off/large vessel occlusion, flow-limiting stenosis, dissection, or aneurysm. There is atherosclerosis of the bilateral carotid bifurcations and proximal cervical ICAs without evidence of flow-limiting stenosis (<50% narrowing per NASCET criteria). Electronically Signed: Kristopher Zepeda MD  6/3/2025 7:18 PM EDT  Workstation ID: [id number]    CT Head Without Contrast Stroke Protocol  Result Date: 6/3/2025  CT HEAD WO CONTRAST STROKE PROTOCOL Date of Exam: 6/3/2025 6:14 PM EDT Indication: Neuro deficit, acute, stroke suspected Neuro Deficit, acute, Stroke suspected. Comparison: No previous exams currently available. Technique: Axial CT images were obtained of the head without contrast administration.  Reconstructed coronal images were also obtained. Automated exposure control and iterative construction methods were used. Scan Time: 1814 Results discussed with JEN Jang at 1819, 6/3/2025. Findings: The calvarium appears intact. Included paranasal sinuses and mastoids appear clear. Symmetric irregularities of the ring of C1, along the right and left lateral margins of the lamina, and also anterior midline of the ring of C1 respectively noted on images 12 and 14. These all appear to represent fusion anomalies with incomplete bony fusion, rather than acute trauma. Orbits appear grossly normal. There is expected degree of generalized cerebral atrophy for age. There is no evidence of hemorrhage, contusion, or edema, mass or mass effect, hydrocephalus, or abnormal extra-axial collection. What initially appears to be a small lacunar type infarct in the medial and superior left thalamus is actually volume averaging artifact with adjacent CSF. There is no evidence of acute infarct and no definite old  infarct.     Impression: 1. Chronic appearing changes of the aging brain. No evidence of acute intracranial disease is seen. 2. 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 The posterior fusion anomalies resemble subacute or old trauma but are smooth margined and perfectly symmetric on the right and left and favored to be developmental variant. Electronically Signed: Aaron Vergara MD  6/3/2025 6:27 PM EDT  Workstation ID: [id number]              Results for orders placed during the hospital encounter of 06/03/25    Adult Transthoracic Echo Complete W/ Cont if Necessary Per Protocol (With Agitated Saline)    Interpretation Summary    Left ventricular systolic function is normal. Estimated left ventricular EF = 67%    The left atrial cavity is severely dilated.    Mild calcification of the aortic valve with no significant stenosis or regurgitation    There is a trivial pericardial effusion.      Plan for Follow-up of Pending Labs/Results: Will contact patient if significant results return  Pending Labs       Order Current Status    Blood Culture - Blood, Hand, Left Preliminary result    Blood Culture - Blood, Hand, Right Preliminary result          Discharge Details        Discharge Medications        New Medications        Instructions Start Date   amoxicillin 500 MG capsule  Commonly known as: AMOXIL   500 mg, Oral, 3 Times Daily   Start Date: June 6, 2025     levothyroxine 25 MCG tablet  Commonly known as: SYNTHROID, LEVOTHROID   25 mcg, Oral, Every Early Morning   Start Date: June 6, 2025     senna 8.6 MG tablet  Commonly known as: SENOKOT   1 tablet, Oral, 2 Times Daily             Continue These Medications        Instructions Start Date   aspirin 81 MG chewable tablet   81 mg, Daily      carbidopa-levodopa  MG per tablet  Commonly known as: SINEMET   1 tablet, 3 Times Daily      lisinopril 10 MG tablet  Commonly known as: PRINIVIL,ZESTRIL   10 mg, Daily       metoprolol succinate  MG 24 hr tablet  Commonly known as: TOPROL-XL   150 mg, Daily      QUEtiapine 25 MG tablet  Commonly known as: SEROquel   12.5 mg, Nightly      rosuvastatin 20 MG tablet  Commonly known as: CRESTOR   20 mg, Daily               No Known Allergies      Discharge Disposition:  Home or Self Care    Diet:  Hospital:  Diet Order   Procedures    Diet: Regular/House; Fluid Consistency: Thin (IDDSI 0)            Activity:  As tolerated    Restrictions or Other Recommendations:  None       CODE STATUS:    Code Status and Medical Interventions: CPR (Attempt to Resuscitate); Limited Support; No intubation (DNI)   Ordered at: 06/03/25 2120     Code Status (Patient has no pulse and is not breathing):    CPR (Attempt to Resuscitate)     Medical Interventions (Patient has pulse or is breathing):    Limited Support     Medical Intervention Limits:    No intubation (DNI)     Level Of Support Discussed With:    Health Care Surrogate       No future appointments.    Additional Instructions for the Follow-ups that You Need to Schedule       Ambulatory Referral to Home Health   As directed      Face to Face Visit Date: 6/5/2025   Follow-up provider for Plan of Care?: I treated the patient in an acute care facility and will not continue treatment after discharge.   Follow-up provider: ABILIO JOSHI [[id number]]   Reason/Clinical Findings: Weakness   Describe mobility limitations that make leaving home difficult: impaired functional mobility, gait, balance, and endurance   Nursing/Therapeutic Services Requested: Skilled Nursing Physical Therapy Occupational Therapy   Skilled nursing orders: Cardiopulmonary assessments Neurovascular assessments   PT orders: Home safety assessment   Occupational orders: Home safety assessment   Frequency: 1 Week 1                      Estela Rodriguez MD  06/05/25      Time Spent on Discharge:  I spent  35  minutes on this discharge activity which included:  face-to-face encounter with the patient, reviewing the data in the system, coordination of the care with the nursing staff as well as consultants, documentation, and entering orders.

## 2025-06-05 NOTE — CASE MANAGEMENT/SOCIAL WORK
Continued Stay Note   [place]     Patient Name: Lane Dong  MRN: [id number]  Today's Date: 6/5/2025    Admit Date: 6/3/2025    Plan: Home HH   Discharge Plan       Row Name 06/05/25 1528       Plan    Plan Home HH    Plan Comments Spoke with spouse at bedside today about therapy recs for HH. Spouse agreeable with referral to [facility] for SN/PT/OT. Referral accepted. CM will cont to follow.    Final Discharge Disposition Code 06 - home with home health care                   Discharge Codes    No documentation.                       Kirsten Garcia RN

## 2025-06-05 NOTE — PROGRESS NOTES
Stroke Neurology Progress Note     Subjective     This patient was seen in follow-up for: concern for stroke  Present for the encounter were: self, patient, patient's wife    Subjective:  Patient lying in bed.  Caregiver at bedside. No acute events overnight.  Tells me he did not sleep well.  Discussed MRI results with patient and plan with patient.  Patient denies questions or concerns. Attempted to call wife with no answer.    Objective      Temp:  [98.1 °F (36.7 °C)-99.9 °F (37.7 °C)] 99.2 °F (37.3 °C)  Heart Rate:  [55-72] 71  Resp:  [15-18] 15  BP: (137-179)/() 170/88        Objective    Physical Exam:  General Appearance: Eyes closed but alerts for evaluation  HEENT: anicteric sclera, no scleral injection  Lungs: respirations appear comfortable, no obvious increased work of breathing  Extremities: No cyanosis or fingernail clubbing   Skin: No rashes in exposed skin areas     Neurological Examination:   Mental status: Eyes closed but alerts for evaluation.  Oriented.  Mild dysarthria.    Cranial Nerves: Visual fields intact. Extraocular movements intact with no nystagmus.  Right eye ptosis  Sensory: Normal sensory exam to light touch.  Motor: Normal tone.   Strength: 3/5 left upper extremity, 3/5 right upper extremity, antigravity in bilateral lower extremities  Tremor: Prominent bilateral upper extremity resting tremor      Labs:    Lab Results   Component Value Date    WBC 6.45 06/03/2025    HGB 13.3 06/03/2025    HCT 41.7 06/03/2025    .5 (H) 06/03/2025     06/03/2025     Lab Results   Component Value Date    GLUCOSE 133 (H) 06/04/2025    BUN 29.3 (H) 06/04/2025    CREATININE 1.77 (H) 06/04/2025    BCR 16.6 06/04/2025    CO2 26.0 06/04/2025    CALCIUM 8.9 06/04/2025    AST 14 06/03/2025    ALT <5 06/03/2025       Results from last 7 days   Lab Units 06/04/25  1237 06/03/25  1824 06/03/25  1823   SODIUM mmol/L 139 142  --    POTASSIUM mmol/L 4.3 4.5  --    CHLORIDE mmol/L 102 102  --   "  CO2 mmol/L 26.0 24.0  --    BUN mg/dL 29.3* 30.7*  --    CREATININE mg/dL 1.77* 1.86* 2.00*   CALCIUM mg/dL 8.9 9.6  --    ALT (SGPT) U/L  --  <5  --    AST (SGOT) U/L  --  14  --    GLUCOSE mg/dL 133* 126*  --        No results found for: \"BLOODCX\"  UA          6/3/2025    19:46   Urinalysis   Squamous Epithelial Cells, UA None Seen    Specific [place], UA 1.014    Ketones, UA Negative    Blood, UA Trace    Leukocytes, UA Large (3+)    Nitrite, UA Negative    RBC, UA 3-5    WBC, UA Too Numerous to Count    Bacteria, UA 3+        Results Review:      All brain images and reports were personally reviewed and I agree with the interpretations except as noted below.    CT Angiogram Head and Neck 6/3/2025  Impression:  No abrupt cut off/large vessel occlusion, flow-limiting stenosis, dissection, or aneurysm. There is atherosclerosis of the bilateral carotid bifurcations and proximal cervical ICAs without evidence of flow-limiting stenosis (<50% narrowing per NASCET criteria).     CT Perfusion 6/3/2025  Impression: No core infarct or ischemic tissue at risk. Grossly symmetric cerebral perfusion.     CT Head Without Contrast Stroke Protocol: 6/3/2025  Impression: 1. Chronic appearing changes of the aging brain. No evidence of acute intracranial disease is seen. 2. 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 The posterior fusion anomalies resemble subacute or old trauma but are smooth margined and perfectly symmetric on the right and left and favored to be developmental variant.     Transthoracic echocardiogram 6//25  Impression:    Left ventricular systolic function is normal. Estimated left ventricular EF = 67%    The left atrial cavity is severely dilated.    Mild calcification of the aortic valve with no significant stenosis or regurgitation    There is a trivial pericardial effusion.      MRI Brain Without Contrast  Result Date: 6/5/2025  Impression: Advanced age-related " changes are noted as above. There is otherwise no evidence of acute infarct, hemorrhage, mass or mass effect. Electronically Signed: Alex Lorenzo MD  6/5/2025 6:43 AM EDT  Workstation ID: [id number]    Assessment/Plan     Assessment:    Lane Dong is an 80-year-old male with a past medical history of HTN, Parkinson disease, HLD, HTN who presented to [facility] emergency department on 6/3/2025 with a left facial droop and speech difficulty.  /124 on arrival    # Transient left facial droop and speech difficulty  Etiology likely hypertensive emergency in setting of /124 on arrival.  Patient found to have a UTI with UA too numerous to count WBC, 3+ bacteria, 3+ leukocytes.    - MRI brain without contrast 6/5/2025 reveals no evidence of acute infarct, hemorrhage mass or mass effect.  - Patient passes bedside swallow screen  - Continue home aspirin 81 mg daily as MRI is negative for stroke  - Continue rosuvastatin 20 mg daily LDL pending  - Recommend Holter monitor prior to discharge, left atrial cavity severely dilated  - PT OT evaluated the patient and recommends home with home health  - Neuro-checks every 4 hours while inpatient  - Blood pressure goal: okay to normalize  - DVT prophylaxis: SCD, heparin Cr 1.89      Discussed the importance of medication compliance Aspirin 81mg daily and Crestor 20mg nightly and lifestyle modifications adequate control of blood pressure, adequate control of cholesterol (goal LDL <70), adequate control of glucose (<140, A1c goal <7), increased physical activity, and implementation of healthy diet to help reduce the risk of future cerebrovascular events.  Also discussed the signs symptoms that would warrant the patient return back to the emergency department including unilateral weakness, unilateral numbness, visual disturbances, loss of balance, speech difficulties, and/or a sudden severe headache.  Patient verbalized understanding.     Plan of care  discussed with patient and caregiver at bedside. Stroke neurology will sign off.  Please call with questions.     Attempted to call patient's wife, Herlinda, with no answer for update on POC and MRI results.    JEN Villaseñor  [facility] STROKE NEURO  06/05/25  07:18 EDT

## 2025-06-05 NOTE — CONSULTS
Chart review for diabetes educator consult.     At the time of this review patient A1c is 5.8, they have no noted history of diabetes and no home medications noted for treatment of diabetes. At this time we do not feel the patient would benefit from diabetes education. Thank you for this consult, should patient needs change please re consult us.

## 2025-06-05 NOTE — DISCHARGE INSTRUCTIONS
-Continue the following medications: Aspirin 81mg daily and Crestor 20mg nightly  -Call 911 for stroke symptoms (unilateral weakness, unilateral numbness, vision loss/double vision, speech difficulty, trouble walking, sudden severe headache or headache with nausea/vomiting/confusion/or decreased level of consciousness)  -Schedule follow-up with your primary care physician    Twice daily blood pressure monitoring, keep a log and take to PCP's office.  If blood pressure going over 160 consistently call your doctor.  PCP follow-up early next week

## 2025-06-07 LAB
QT INTERVAL: 412 MS
QTC INTERVAL: 441 MS

## 2025-06-09 LAB
BACTERIA SPEC AEROBE CULT: NORMAL
BACTERIA SPEC AEROBE CULT: NORMAL